# Patient Record
Sex: FEMALE | Race: BLACK OR AFRICAN AMERICAN | NOT HISPANIC OR LATINO | Employment: FULL TIME | URBAN - METROPOLITAN AREA
[De-identification: names, ages, dates, MRNs, and addresses within clinical notes are randomized per-mention and may not be internally consistent; named-entity substitution may affect disease eponyms.]

---

## 2022-06-15 LAB
EXTERNAL ABO GROUPING: NORMAL
EXTERNAL ANTIBODY SCREEN: NORMAL
EXTERNAL HEPATITIS B SURFACE ANTIGEN: NORMAL
EXTERNAL HIV-1/2 AB-AG: NORMAL
EXTERNAL RH FACTOR: POSITIVE
EXTERNAL RUBELLA IGG QUANTITATION: 5.66
EXTERNAL SYPHILIS RPR SCREEN: NORMAL

## 2022-11-09 LAB
GLUCOSE 1H P GLC SERPL-MCNC: 132 MG/DL
GLUCOSE 2H P 75 G GLC PO SERPL-MCNC: 70 MG/DL
GLUCOSE 3H P 100 G GLC PO SERPL-MCNC: 50 MG/DL
GLUCOSE P FAST SERPL-MCNC: 67 MG/DL

## 2022-12-01 ENCOUNTER — HOSPITAL ENCOUNTER (OUTPATIENT)
Facility: HOSPITAL | Age: 37
Discharge: HOME | End: 2022-12-01
Attending: OBSTETRICS & GYNECOLOGY | Admitting: OBSTETRICS & GYNECOLOGY

## 2022-12-01 VITALS
HEIGHT: 67 IN | DIASTOLIC BLOOD PRESSURE: 73 MMHG | RESPIRATION RATE: 18 BRPM | HEART RATE: 77 BPM | BODY MASS INDEX: 37.51 KG/M2 | SYSTOLIC BLOOD PRESSURE: 113 MMHG | WEIGHT: 239 LBS | OXYGEN SATURATION: 98 % | TEMPERATURE: 97.6 F

## 2022-12-01 PROBLEM — Z3A.34 34 WEEKS GESTATION OF PREGNANCY: Status: ACTIVE | Noted: 2022-12-01

## 2022-12-01 RX ORDER — ASPIRIN 81 MG/1
81 TABLET, CHEWABLE ORAL DAILY
COMMUNITY

## 2022-12-01 RX ORDER — FERROUS SULFATE 325(65) MG
325 TABLET ORAL
COMMUNITY

## 2022-12-01 NOTE — PROGRESS NOTES
L&D Triage Note - OB/GYN  Corrine Stevens 30 y o  female MRN: [id number]  Unit/Bed#: LD TRIAGE 2 Encounter: [id number]      ASSESSMENT:    Corrine Stevens is a 30 y o  [id number] at 34w5d presenting to triage this afternoon for leakage of fluid    PLAN:    1) rule out rupture of membranes  - Denies abdominal pain at this time  Reports intermittent and inconsistent contractions  - Speculum examination: cervical os appears visually closed, no vaginal bleeding or pooling noted, small amount of white vaginal discharge noted  - Nitrazine negative, Slides negative for ferning, clue cells, hyphae, trichomonads on microscopy  - IQRA 12 35 cm  - SVE 0 5/50/-3, posterior  - Vital signs stable in triage this afternoon    Discharge Instructions:   Continue routine prenatal care  Discharge from [facility] triage with  labor precautions    - Reviewed rupture of membranes, false vs true labor, decreased fetal movement, and vaginal bleeding   - Pt to call provider with any concerns and follow up at her next scheduled prenatal appointment with Dr Mark Heard next week  - Case discussed with Dr Waylon Gottlieb, PGY-4  Dr Gal Bañuelos was available for discussion of pt care  SUBJECTIVE:    Corrine Stevens 30 y o  [id number] at 34w5d with an Estimated Date of Delivery: 23     Ramon Ceballos states that she woke up at 0250 hrs this morning in a puddle of clear odorless fluid  She reports intermittent and inconsistent contractions  She reports that she has not leaked significant fluid since that time  She denies any vaginal bleeding  She reports good fetal movement  Reports intermittent headache that resolves without intervention  She denies fever, chills, chest pain, shortness of breath, RUQ pain, or any other complaints at this time  She reports that she receives her care in [place] and recently moved to the area  She states that she still follows with Dr Mark Heard in [place] and has appt with him next week  All hx per pt   Record release signed in the event that she require    Her current obstetrical history is significant for elevated 1 hr GTT, but normal 3 hr GTT  Anemia in pregnancy (per pt labs on her phone - most recent 10/2022 Hgb 9 8, Plt 265,000)  She report normotensive pregnancy  Her past obstetrical history is significant for Hx Term  at 38 weeks ()  Pt reports that she was admitted to ICU for preeclampsia with severe features that she developed on PPD#3 for severe blood pressures       Reported Surgical hx:   - Cholecystectomy  - Endometrioma removal during prior pregnancy    Medications:    - Prenatal Vitamin daily  - Tylenol PRN  - 81 mg ASA daily  - Metamucil daily    Allergies:   Ceftriaxone    OBJECTIVE:    Vitals:    22 1325   BP: 113/73   Pulse: 77   Resp:    Temp:    SpO2:      ROS:  Constitutional: Negative  Respiratory: Negative  Cardiovascular: Negative    Gastrointestinal: Negative    General Physical Exam:  General: in no apparent distress, in no respiratory distress and acyanotic, afebrile and normal vitals  Cardiovascular: Cor RRR  Lungs: non-labored breathing  Abdomen: abdomen is soft without significant tenderness, masses, organomegaly or guarding  Lower extremeties: nontender    Cervical Exam  Speculum: Speculum examination: cervical os appears visually closed, no vaginal bleeding or pooling noted, small amount of white vaginal discharge noted  SVE: 0 5 / 50% / -3    Fetal monitoring:  FHT:  120 bpm/ Moderate 6 - 25 bpm / 15 x 15 accelerations present, no decelerations  [place]: contractions intermittent and inconsistent     KOH/WTMT:     Infection:   - no clue cells    - no hyphae   - no trichomonads present    Membrane status   - no ferning   - negative Nitrazine   - no pooling     Imaging:      Abd  US   IQRA      - Q1 3 95 cm     - Q2 4 2 cm     - Q3 1 93 cm     - Q4 2 27 cm     - Total: 12 35 cm   Placenta: Anterior   Presentation: [address], DO,  OBGYN PGY-1  2022 2:11 PM

## 2022-12-01 NOTE — PROCEDURES
Gigi Lin, a [id number] at 34w5d with an NOEL of 1/7/2023, by Last Menstrual Period, was seen at [address] for the following procedure(s): $Procedure Type: IQRA]         4 Quadrant IQRA  IQRA Q1 (cm): 4 cm  IQRA Q2 (cm): 4 2 cm  IQRA Q3 (cm): 1 9 cm  IQRA Q4 (cm): 2 3 cm  IQRA TOTAL (cm): 12 4 cm                       Roosevelt Hayes DO  OB/GYN PGY-1

## 2022-12-06 LAB — EXTERNAL GROUP B STREP ANTIGEN: NEGATIVE

## 2022-12-25 ENCOUNTER — ANESTHESIA EVENT (INPATIENT)
Dept: ANESTHESIOLOGY | Facility: HOSPITAL | Age: 37
End: 2022-12-25

## 2022-12-25 ENCOUNTER — ANESTHESIA (INPATIENT)
Dept: ANESTHESIOLOGY | Facility: HOSPITAL | Age: 37
End: 2022-12-25

## 2022-12-25 ENCOUNTER — HOSPITAL ENCOUNTER (INPATIENT)
Facility: HOSPITAL | Age: 37
LOS: 2 days | Discharge: HOME | End: 2022-12-27
Attending: OBSTETRICS & GYNECOLOGY | Admitting: OBSTETRICS & GYNECOLOGY

## 2022-12-25 DIAGNOSIS — R82.5 POSITIVE URINE DRUG SCREEN: ICD-10-CM

## 2022-12-25 DIAGNOSIS — Z3A.38 38 WEEKS GESTATION OF PREGNANCY: ICD-10-CM

## 2022-12-25 PROBLEM — O09.299 HX OF PREECLAMPSIA, PRIOR PREGNANCY, CURRENTLY PREGNANT: Status: ACTIVE | Noted: 2022-12-25

## 2022-12-25 LAB
ABO GROUP BLD: NORMAL
ABO GROUP BLD: NORMAL
ALBUMIN SERPL BCP-MCNC: 3.3 G/DL (ref 3.5–5)
ALP SERPL-CCNC: 71 U/L (ref 34–104)
ALT SERPL W P-5'-P-CCNC: 24 U/L (ref 7–52)
AMPHETAMINES SERPL QL SCN: NEGATIVE
ANION GAP SERPL CALCULATED.3IONS-SCNC: 8 MMOL/L (ref 4–13)
AST SERPL W P-5'-P-CCNC: 26 U/L (ref 13–39)
BARBITURATES UR QL: NEGATIVE
BASE EXCESS BLDCOA CALC-SCNC: -3.3 MMOL/L (ref 3–11)
BASE EXCESS BLDCOV CALC-SCNC: -4.3 MMOL/L (ref 1–9)
BENZODIAZ UR QL: NEGATIVE
BILIRUB SERPL-MCNC: 0.49 MG/DL (ref 0.2–1)
BLD GP AB SCN SERPL QL: NEGATIVE
BUN SERPL-MCNC: 11 MG/DL (ref 5–25)
CALCIUM ALBUM COR SERPL-MCNC: 8.9 MG/DL (ref 8.3–10.1)
CALCIUM SERPL-MCNC: 8.3 MG/DL (ref 8.4–10.2)
CHLORIDE SERPL-SCNC: 107 MMOL/L (ref 96–108)
CO2 SERPL-SCNC: 20 MMOL/L (ref 21–32)
COCAINE UR QL: NEGATIVE
CREAT SERPL-MCNC: 0.67 MG/DL (ref 0.6–1.3)
CREAT UR-MCNC: 177 MG/DL
ERYTHROCYTE [DISTWIDTH] IN BLOOD BY AUTOMATED COUNT: 14.3 % (ref 11.6–15.1)
GFR SERPL CREATININE-BSD FRML MDRD: 114 ML/MIN/1.73SQ M
GLUCOSE SERPL-MCNC: 72 MG/DL (ref 65–140)
HCO3 BLDCOA-SCNC: 22.1 MMOL/L (ref 17.3–27.3)
HCO3 BLDCOV-SCNC: 19.8 MMOL/L (ref 12.2–28.6)
HCT VFR BLD AUTO: 32.3 % (ref 34.8–46.1)
HGB BLD-MCNC: 10.6 G/DL (ref 11.5–15.4)
MCH RBC QN AUTO: 27.2 PG (ref 26.8–34.3)
MCHC RBC AUTO-ENTMCNC: 32.8 G/DL (ref 31.4–37.4)
MCV RBC AUTO: 83 FL (ref 82–98)
METHADONE UR QL: NEGATIVE
O2 CT VFR BLDCOA CALC: 12.6 ML/DL
OPIATES UR QL SCN: POSITIVE
OXYCODONE+OXYMORPHONE UR QL SCN: NEGATIVE
OXYHGB MFR BLDCOA: 65.9 %
OXYHGB MFR BLDCOV: 75.2 %
PCO2 BLDCOA: 41.1 MM[HG] (ref 30–60)
PCO2 BLDCOV: 33.5 MM HG (ref 27–43)
PCP UR QL: NEGATIVE
PH BLDCOA: 7.35 [PH] (ref 7.23–7.43)
PH BLDCOV: 7.39 [PH] (ref 7.19–7.49)
PLATELET # BLD AUTO: 279 THOUSANDS/UL (ref 149–390)
PMV BLD AUTO: 10.1 FL (ref 8.9–12.7)
PO2 BLDCOA: 27.2 MM HG (ref 5–25)
PO2 BLDCOV: 32.2 MM HG (ref 15–45)
POTASSIUM SERPL-SCNC: 3.7 MMOL/L (ref 3.5–5.3)
PROT SERPL-MCNC: 6.5 G/DL (ref 6.4–8.4)
PROT UR-MCNC: 29 MG/DL
PROT/CREAT UR: 0.16 MG/G{CREAT} (ref 0–0.1)
RBC # BLD AUTO: 3.89 MILLION/UL (ref 3.81–5.12)
RH BLD: POSITIVE
RH BLD: POSITIVE
SAO2 % BLDCOV: 14.1 ML/DL
SODIUM SERPL-SCNC: 135 MMOL/L (ref 135–147)
SPECIMEN EXPIRATION DATE: NORMAL
THC UR QL: NEGATIVE
WBC # BLD AUTO: 9.84 THOUSAND/UL (ref 4.31–10.16)

## 2022-12-25 PROCEDURE — 3E033VJ INTRODUCTION OF OTHER HORMONE INTO PERIPHERAL VEIN, PERCUTANEOUS APPROACH: ICD-10-PCS | Performed by: OBSTETRICS & GYNECOLOGY

## 2022-12-25 PROCEDURE — 10H07YZ INSERTION OF OTHER DEVICE INTO PRODUCTS OF CONCEPTION, VIA NATURAL OR ARTIFICIAL OPENING: ICD-10-PCS | Performed by: OBSTETRICS & GYNECOLOGY

## 2022-12-25 PROCEDURE — 4A1HXCZ MONITORING OF PRODUCTS OF CONCEPTION, CARDIAC RATE, EXTERNAL APPROACH: ICD-10-PCS | Performed by: OBSTETRICS & GYNECOLOGY

## 2022-12-25 RX ORDER — ONDANSETRON 2 MG/ML
4 INJECTION INTRAMUSCULAR; INTRAVENOUS EVERY 6 HOURS PRN
Status: DISCONTINUED | OUTPATIENT
Start: 2022-12-25 | End: 2022-12-25

## 2022-12-25 RX ORDER — DIAPER,BRIEF,INFANT-TODD,DISP
1 EACH MISCELLANEOUS DAILY PRN
Status: DISCONTINUED | OUTPATIENT
Start: 2022-12-25 | End: 2022-12-27 | Stop reason: HOSPADM

## 2022-12-25 RX ORDER — OXYTOCIN/RINGER'S LACTATE 30/500 ML
1-30 PLASTIC BAG, INJECTION (ML) INTRAVENOUS
Status: DISCONTINUED | OUTPATIENT
Start: 2022-12-25 | End: 2022-12-25

## 2022-12-25 RX ORDER — IBUPROFEN 600 MG/1
600 TABLET ORAL EVERY 6 HOURS
Status: DISCONTINUED | OUTPATIENT
Start: 2022-12-25 | End: 2022-12-25

## 2022-12-25 RX ORDER — ROPIVACAINE HYDROCHLORIDE 2 MG/ML
INJECTION, SOLUTION EPIDURAL; INFILTRATION; PERINEURAL CONTINUOUS PRN
Status: DISCONTINUED | OUTPATIENT
Start: 2022-12-25 | End: 2022-12-26 | Stop reason: HOSPADM

## 2022-12-25 RX ORDER — BUPIVACAINE HYDROCHLORIDE 2.5 MG/ML
30 INJECTION, SOLUTION EPIDURAL; INFILTRATION; INTRACAUDAL ONCE AS NEEDED
Status: DISCONTINUED | OUTPATIENT
Start: 2022-12-25 | End: 2022-12-25

## 2022-12-25 RX ORDER — OXYTOCIN/RINGER'S LACTATE 30/500 ML
250 PLASTIC BAG, INJECTION (ML) INTRAVENOUS ONCE
Status: DISCONTINUED | OUTPATIENT
Start: 2022-12-25 | End: 2022-12-27 | Stop reason: HOSPADM

## 2022-12-25 RX ORDER — SODIUM CHLORIDE, SODIUM LACTATE, POTASSIUM CHLORIDE, CALCIUM CHLORIDE 600; 310; 30; 20 MG/100ML; MG/100ML; MG/100ML; MG/100ML
125 INJECTION, SOLUTION INTRAVENOUS CONTINUOUS
Status: DISCONTINUED | OUTPATIENT
Start: 2022-12-25 | End: 2022-12-25

## 2022-12-25 RX ORDER — DOCUSATE SODIUM 100 MG/1
100 CAPSULE, LIQUID FILLED ORAL 2 TIMES DAILY
Status: DISCONTINUED | OUTPATIENT
Start: 2022-12-25 | End: 2022-12-27 | Stop reason: HOSPADM

## 2022-12-25 RX ORDER — ACETAMINOPHEN 325 MG/1
975 TABLET ORAL EVERY 8 HOURS PRN
Status: DISCONTINUED | OUTPATIENT
Start: 2022-12-25 | End: 2022-12-25

## 2022-12-25 RX ORDER — CALCIUM CARBONATE 200(500)MG
1000 TABLET,CHEWABLE ORAL DAILY PRN
Status: DISCONTINUED | OUTPATIENT
Start: 2022-12-25 | End: 2022-12-27 | Stop reason: HOSPADM

## 2022-12-25 RX ORDER — CALCIUM CARBONATE 200(500)MG
500 TABLET,CHEWABLE ORAL DAILY PRN
Status: DISCONTINUED | OUTPATIENT
Start: 2022-12-25 | End: 2022-12-25

## 2022-12-25 RX ORDER — ONDANSETRON 2 MG/ML
4 INJECTION INTRAMUSCULAR; INTRAVENOUS EVERY 8 HOURS PRN
Status: DISCONTINUED | OUTPATIENT
Start: 2022-12-25 | End: 2022-12-27 | Stop reason: HOSPADM

## 2022-12-25 RX ORDER — LIDOCAINE HYDROCHLORIDE AND EPINEPHRINE 15; 5 MG/ML; UG/ML
INJECTION, SOLUTION EPIDURAL AS NEEDED
Status: DISCONTINUED | OUTPATIENT
Start: 2022-12-25 | End: 2022-12-26 | Stop reason: HOSPADM

## 2022-12-25 RX ORDER — BUTORPHANOL TARTRATE 1 MG/ML
1 INJECTION, SOLUTION INTRAMUSCULAR; INTRAVENOUS ONCE
Status: COMPLETED | OUTPATIENT
Start: 2022-12-25 | End: 2022-12-25

## 2022-12-25 RX ORDER — ACETAMINOPHEN 325 MG/1
650 TABLET ORAL EVERY 4 HOURS PRN
Status: DISCONTINUED | OUTPATIENT
Start: 2022-12-25 | End: 2022-12-27 | Stop reason: HOSPADM

## 2022-12-25 RX ADMIN — ROPIVACAINE HYDROCHLORIDE 10 ML/HR: 2 INJECTION, SOLUTION EPIDURAL; INFILTRATION at 11:04

## 2022-12-25 RX ADMIN — SODIUM CHLORIDE, POTASSIUM CHLORIDE, SODIUM LACTATE AND CALCIUM CHLORIDE 125 ML/HR: 600; 310; 30; 20 INJECTION, SOLUTION INTRAVENOUS at 13:55

## 2022-12-25 RX ADMIN — LIDOCAINE HYDROCHLORIDE AND EPINEPHRINE 3 ML: 15; 5 INJECTION, SOLUTION EPIDURAL at 11:01

## 2022-12-25 RX ADMIN — ROPIVACAINE HYDROCHLORIDE: 2 INJECTION, SOLUTION EPIDURAL; INFILTRATION at 11:14

## 2022-12-25 RX ADMIN — Medication 1 APPLICATION: at 19:14

## 2022-12-25 RX ADMIN — Medication 2 MILLI-UNITS/MIN: at 12:13

## 2022-12-25 RX ADMIN — SODIUM CHLORIDE, POTASSIUM CHLORIDE, SODIUM LACTATE AND CALCIUM CHLORIDE 125 ML/HR: 600; 310; 30; 20 INJECTION, SOLUTION INTRAVENOUS at 11:20

## 2022-12-25 RX ADMIN — LIDOCAINE HYDROCHLORIDE AND EPINEPHRINE 2 ML: 15; 5 INJECTION, SOLUTION EPIDURAL at 11:04

## 2022-12-25 RX ADMIN — IBUPROFEN 600 MG: 600 TABLET, FILM COATED ORAL at 17:26

## 2022-12-25 RX ADMIN — HYDROCORTISONE 1 APPLICATION: 1 CREAM TOPICAL at 19:14

## 2022-12-25 RX ADMIN — ACETAMINOPHEN 650 MG: 325 TABLET, FILM COATED ORAL at 22:37

## 2022-12-25 RX ADMIN — BUTORPHANOL TARTRATE 1 MG: 1 INJECTION, SOLUTION INTRAMUSCULAR; INTRAVENOUS at 09:22

## 2022-12-25 RX ADMIN — SODIUM CHLORIDE, POTASSIUM CHLORIDE, SODIUM LACTATE AND CALCIUM CHLORIDE 125 ML/HR: 600; 310; 30; 20 INJECTION, SOLUTION INTRAVENOUS at 08:15

## 2022-12-25 RX ADMIN — WITCH HAZEL 1 PAD: 500 SOLUTION RECTAL; TOPICAL at 19:14

## 2022-12-25 RX ADMIN — SODIUM CHLORIDE, POTASSIUM CHLORIDE, SODIUM LACTATE AND CALCIUM CHLORIDE 125 ML/HR: 600; 310; 30; 20 INJECTION, SOLUTION INTRAVENOUS at 09:19

## 2022-12-25 NOTE — PROGRESS NOTES
Second UDS for patient using initial urine sample again positive for opioids after being run on a 2nd machine in the lab  Patient has been considering what might cause a false positive UDS, and she disclosed that she eats 1 poppy seed bagel every single day  Upon literature review, 1 poppy seed bagel can contain enough poppy seeds to cause a false positive for opioids on a UDS  Though this cannot be definitively said to be a false positive, our clinical suspicion that this is a false positive result remains high  Discussed with patient that she will still be evaluated by case management postpartum but that this is a precaution, and she expressed understanding and is agreeable  Dr Alex Laboy aware      Jj Núñez MD  OBGYN Resident  12/25/22  2:56 PM

## 2022-12-25 NOTE — H&P
H & P- Obstetrics   Dimple Mar 30 y o  female MRN: [id number]  Unit/Bed#: LD TRIAGE 2- Encounter: [id number]    Assessment: 30 y o  [id number] at 38w1d admitted for PROM vs  SROM in labor  SVE: 360/-3  FHT: category 1  Clinical EFW: 7 5 lb by Leopold's maneuver  Cephalic confirmed by US  GBS status: negative  Postpartum contraception plan: undecided    Plan:   * 38 weeks gestation of pregnancy  Assessment & Plan  PROM vs  SROM in labor at 4 am for clear fluid  Contractions every 5 min  Prior  at term  Receives care in [place]  Prenatal labs reviewed on patient's EMR on her phone  · Admit for expectant management with plan for augmentation as needed pending cervical change  · Continuous fetal monitoring  · CLD  · Routine admission labs (CBC, RPR, T&S)  · Analgesia at maternal request    Hx of preeclampsia, prior pregnancy, currently pregnant  Assessment & Plan  History of postpartum preeclampsia with severe features in 1st pregnancy  Per patient, normotensive this pregnancy  Asymptomatic  · CBC, CMP, and P:C on admission  · Routine BP monitoring    Discussed case and plan w/ Dr Charley Snellen    Chief Complaint: leaking fluid and contractions    HPI:  Sandra Johnson is a 30 y o  [id number] with an NOEL of 2023, by Last Menstrual Period at 38w1d who is being admitted for PROM vs  SROM in labor  She complains of uterine contractions, occurring every 5 minutes, has large amounts of fluid leaking clear starting at 4 am, and reports no VB  She states she has felt good FM  Patient Active Problem List   Diagnosis   • 34 weeks gestation of pregnancy       Baby complications/comments: none    Review of Systems   Constitutional: Negative for chills and fever  Eyes: Negative for visual disturbance  Respiratory: Negative for cough and shortness of breath  Cardiovascular: Negative for chest pain and leg swelling  Gastrointestinal: Negative for abdominal pain, diarrhea, nausea and vomiting     Genitourinary: Negative for dysuria, frequency, hematuria, pelvic pain, urgency, vaginal bleeding and vaginal discharge  Neurological: Negative for dizziness, light-headedness and headaches  OB Hx:  OB History    Para Term  AB Living   4 1 1   2 1   SAB IAB Ectopic Multiple Live Births           1      # Outcome Date GA Lbr Oracio/2nd Weight Sex Delivery Anes PTL Lv   4 Current            3 AB 21           2 Term 14    F Vag-Spont EPI N ROGERS      Complications: Pre-eclampsia   1 AB 2004     TAB          Past Medical Hx:  No past medical history on file  Past Surgical hx:  No past surgical history on file  Social Hx:  Alcohol use: no  Tobacco use: no  Other substance use: no  Other: N/A    Allergies   Allergen Reactions   • Ceftriaxone Anaphylaxis       Medications Prior to Admission   Medication   • aspirin 81 mg chewable tablet   • ferrous sulfate 325 (65 Fe) mg tablet   • Prenatal MV-Min-Fe Fum-FA-DHA (PRENATAL 1 PO)   • psyllium (METAMUCIL) 58 6 % powder       Objective:  Temp:  [97 7 °F (36 5 °C)] 97 7 °F (36 5 °C)  HR:  [90] 90  Resp:  [18] 18  BP: (113)/(58) 113/58  Body mass index is 37 75 kg/m²  Physical Exam:  Physical Exam  Constitutional:       General: She is not in acute distress  Appearance: Normal appearance  She is not ill-appearing  HENT:      Mouth/Throat:      Mouth: Mucous membranes are moist    Eyes:      Extraocular Movements: Extraocular movements intact  Cardiovascular:      Rate and Rhythm: Normal rate and regular rhythm  Heart sounds: Normal heart sounds  Pulmonary:      Effort: Pulmonary effort is normal       Breath sounds: Normal breath sounds  Abdominal:      General: There is no distension  Palpations: Abdomen is soft  Tenderness: There is no abdominal tenderness  There is no guarding  Musculoskeletal:         General: No swelling  Right lower leg: No edema  Left lower leg: No edema     Neurological:      General: No focal deficit present  Mental Status: She is alert  Skin:     General: Skin is warm and dry  Coloration: Skin is not jaundiced or pale  Psychiatric:         Mood and Affect: Mood normal          Behavior: Behavior normal          Thought Content:  Thought content normal          Judgment: Judgment normal             FHT:  Baseline: 125 bpm  Variability: moderate  Accelerations: present  Decelerations: absent  Category 1    TOCO:  Contractions every 4-6 min    No results found for: WBC, HGB, HCT, PLT  No results found for: NA, K, CL, CO2, BUN, CREATININE, GLUCOSE, AST, ALT  Prenatal Labs: Reviewed      Blood type: O positive  Antibody: negative  GBS: negative  HIV: negative  Rubella: Immune  Varicella: Immune  VDRL/RPR: Non reactive  HBsAg: Negative  Chlamydia: Negative  Gonorrhea: Negative  Diabetes 1 hour screen: 157  3 hour glucose: 67, 132, 70, 50  Platelets: 939  Hgb: 9 8  Normal hemoglobin fractionation cascade  >2 Midnights  INPATIENT     Signature/Title: Aleida Rutherford MD  Date: 12/25/2022  Time: 7:42 AM

## 2022-12-25 NOTE — PLAN OF CARE
Problem: PAIN - ADULT  Goal: Verbalizes/displays adequate comfort level or baseline comfort level  Description: Interventions:  - Encourage patient to monitor pain and request assistance  - Assess pain using appropriate pain scale  - Administer analgesics based on type and severity of pain and evaluate response  - Implement non-pharmacological measures as appropriate and evaluate response  - Consider cultural and social influences on pain and pain management  - Notify physician/advanced practitioner if interventions unsuccessful or patient reports new pain  Outcome: Progressing     Problem: INFECTION - ADULT  Goal: Absence or prevention of progression during hospitalization  Description: INTERVENTIONS:  - Assess and monitor for signs and symptoms of infection  - Monitor lab/diagnostic results  - Monitor all insertion sites, i e  indwelling lines, tubes, and drains  - Monitor endotracheal if appropriate and nasal secretions for changes in amount and color  - [place] appropriate cooling/warming therapies per order  - Administer medications as ordered  - Instruct and encourage patient and family to use good hand hygiene technique  - Identify and instruct in appropriate isolation precautions for identified infection/condition  Outcome: Progressing  Goal: Absence of fever/infection during neutropenic period  Description: INTERVENTIONS:  - Monitor WBC    Outcome: Progressing     Problem: SAFETY ADULT  Goal: Patient will remain free of falls  Description: INTERVENTIONS:  - Educate patient/family on patient safety including physical limitations  - Instruct patient to call for assistance with activity   - Keep Call bell within reach  - Keep bed low and locked with side rails adjusted as appropriate  - Keep care items and personal belongings within reach  - Initiate and maintain comfort rounds  - Offer Toileting in advance of need    Outcome: Progressing     Problem: DISCHARGE PLANNING  Goal: Discharge to home or other facility with appropriate resources  Description: INTERVENTIONS:  - Identify barriers to discharge w/patient and caregiver  - Arrange for needed discharge resources and transportation as appropriate  - Identify discharge learning needs (meds, wound care, etc )  - Refer to Case Management Department for coordinating discharge planning if the patient needs post-hospital services based on physician/advanced practitioner order or complex needs related to functional status, cognitive ability, or social support system  Outcome: Progressing     Problem: Knowledge Deficit  Goal: Patient/family/caregiver demonstrates understanding of disease process, treatment plan, medications, and discharge instructions  Description: Complete learning assessment and assess knowledge base  Interventions:  - Provide teaching at level of understanding  - Provide teaching via preferred learning methods  Outcome: Progressing  Goal: Verbalizes understanding of labor plan  Description: Assess patient/family/caregiver's baseline knowledge level and ability to understand information  Provide education via patient/family/caregiver's preferred learning method at appropriate level of understanding  1  Provide teaching at level of understanding  2  Provide teaching via preferred learning method(s)    Outcome: Progressing     Problem: BIRTH - VAGINAL/ SECTION  Goal: Fetal and maternal status remain reassuring during the birth process  Description: INTERVENTIONS:  - Monitor vital signs  - Monitor fetal heart rate  - Monitor uterine activity  - Monitor labor progression (vaginal delivery)  - DVT prophylaxis  - Antibiotic prophylaxis  Outcome: Progressing  Goal: Emotionally satisfying birthing experience for mother/fetus  Description: Interventions:  - Assess, plan, implement and evaluate the nursing care given to the patient in labor  - Advocate the philosophy that each childbirth experience is a unique experience and support the family's chosen level of involvement and control during the labor process   - Actively participate in both the patient's and family's teaching of the birth process  - Consider cultural, Restorationism and age-specific factors and plan care for the patient in labor  Outcome: Progressing     Problem: Labor & Delivery  Goal: Manages discomfort  Description: Assess and monitor for signs and symptoms of discomfort  Assess patient's pain level regularly and per hospital policy  Administer medications as ordered  Support use of nonpharmacological methods to help control pain such as distraction, imagery, relaxation, and application of heat and cold  Collaborate with interdisciplinary team and patient to determine appropriate pain management plan  1  Include patient in decisions related to comfort  2  Offer non-pharmacological pain management interventions  3  Report ineffective pain management to physician  Outcome: Progressing  Goal: Patient vital signs are stable  Description: 1  Assess vital signs - vaginal delivery    Outcome: Progressing

## 2022-12-25 NOTE — ANESTHESIA PROCEDURE NOTES
Epidural Block    Patient location during procedure: OB  Start time: 12/25/2022 10:58 AM  Reason for block: procedure for pain and at surgeon's request  Staffing  Performed: CRNA   Anesthesiologist: Radha Gallo DO  Resident/CRNA: Zulema Ferrari CRNA  Preanesthetic Checklist  Completed: patient identified, IV checked, site marked, risks and benefits discussed, surgical consent, monitors and equipment checked, pre-op evaluation and timeout performed  Epidural  Patient position: sitting  Prep: ChloraPrep  Patient monitoring: frequent blood pressure checks, continuous pulse ox and heart rate  Approach: midline  Location: lumbar  Injection technique: JABIER air  Needle  Needle type: Tuohy   Needle gauge: 18 G  Catheter type: side hole  Catheter size: 20 G  Catheter at skin depth: 14 cm  Catheter securement method: stabilization device  Test dose: negative  Assessment  Number of attempts: 1negative aspiration for CSF, negative aspiration for heme and no paresthesia on injection  patient tolerated the procedure well with no immediate complications

## 2022-12-25 NOTE — ANESTHESIA PREPROCEDURE EVALUATION
Procedure:  LABOR ANALGESIA    Relevant Problems   ANESTHESIA (within normal limits)      CARDIO   (-) HTN (hypertension)      GI/HEPATIC (within normal limits)      /RENAL (within normal limits)      GYN   (+) 38 weeks gestation of pregnancy      HEMATOLOGY (within normal limits)      MUSCULOSKELETAL (within normal limits)      NEURO/PSYCH   (+) Hx of preeclampsia, prior pregnancy, currently pregnant      PULMONARY   (-) Asthma        Physical Exam    Airway    Mallampati score: III  TM Distance: >3 FB  Neck ROM: full     Dental   No notable dental hx     Cardiovascular      Pulmonary      Other Findings        Anesthesia Plan  ASA Score- 2     Anesthesia Type- epidural with ASA Monitors  Additional Monitors:   Airway Plan:           Plan Factors-Exercise tolerance (METS): >4 METS  Chart reviewed  Existing labs reviewed  Patient summary reviewed  Patient is not a current smoker  Induction- intravenous  Postoperative Plan-     Informed Consent- Anesthetic plan and risks discussed with patient  I personally reviewed this patient with the CRNA  Discussed and agreed on the Anesthesia Plan with the CRNA  Arianna Malone

## 2022-12-25 NOTE — ASSESSMENT & PLAN NOTE
Meeting postpartum milestones  Pain well controlled  Voiding spontaneously  Appropriate bowel function  Tolerating regular diet  Lochia wnl  Breastfeeding  · Continue routine postpartum care

## 2022-12-25 NOTE — L&D DELIVERY NOTE
Vaginal Delivery Summary - OB/GYN   Abigail Rosas 30 y o  female MRN: [id number]  Unit/Bed#: -01 Encounter: [id number]      Pre-delivery Diagnosis:   1  Pregnancy at 38w1d  2  History of postpartum preeclampsia with severe features in prior pregnancy    Post-delivery Diagnosis: same, delivered    Procedure: Spontaneous Vaginal Delivery    Attending: Dr Amparo Gonzales    Assistant(s): Dr Amairani Duncan, Dr Chanelle Jackson    Anesthesia: Epidural    QBL: 53 mL    Complications: none apparent    Specimens:   1  Arterial and venous cord gases  2  Cord blood  3  Segment of umbilical cord  4  Placenta to storage     Findings:  1  Viable male on 2022 at 1527, with APGARS of 9 and 9 at 1 and 5 minutes respectively  2  Spontaneous delivery of intact placenta at 1533  3  Intact perineum with hemostatic perineal abrasion  4  Blood gases:  Recent Results (from the past 1 hour(s))   CORD, Blood gas, arterial    Collection Time: 22  3:32 PM   Result Value Ref Range    pH, Cord Art 7 348 7 230 - 7 430    pCO2, Cord Art 41 1 30 0 - 60 0    pO2, Cord Art 27 2 (H) 5 0 - 25 0 mm HG    HCO3, Cord Art 22 1 17 3 - 27 3 mmol/L    Base Exc, Cord Art -3 3 (L) 3 0 - 11 0 mmol/L    O2 Content, Cord Art 12 6 ml/dl    O2 Hgb, Arterial Cord 65 9 %   CORD, Blood gas, venous    Collection Time: 22  3:32 PM   Result Value Ref Range    pH, Cord Todd 7 389 7 190 - 7 490    pCO2, Cord Todd 33 5 27 0 - 43 0 mm HG    pO2, Cord Todd 32 2 15 0 - 45 0 mm HG    HCO3, Cord Todd 19 8 12 2 - 28 6 mmol/L    Base Exc, Cord Todd -4 3 (L) 1 0 - 9 0 mmol/L    O2 Cont, Cord Todd 14 1 mL/dL    O2 HGB,VENOUS CORD 75 2 %       Disposition:  Patient tolerated the procedure well and was recovering in labor and delivery room     Brief history and labor course:  Ms Abigail Rosas is a 30 y o   at 38w1d  She presented to labor and delivery for contractions and leaking fluid    Her pregnancy was complicated by history of postpartum preeclampsia with severe features in a prior pregnancy  Cervical exam on admission was 3/60/-2 and was unchanged after 2 hours, at which time she was diagnosed with prelabor rupture of membranes (PROM) and started on pitocin for induction  She received an epidural then developed intermittent variable decelerations that improved with maternal repositioning and IV fluids  Contractions were difficult to trace with toco, and an IUPC was placed to better trace contractions and to better titrate pitocin  She then progressed to full dilation and began to push  Description of procedure:  After pushing for 3 minutes, at 1527 patient delivered a viable male , wt pending, apgars of 9 (1 min) and 9 (5 min)  The fetal vertex delivered spontaneously in SUSSY position  There was no nuchal cord  The left anterior shoulder delivered atraumatically with maternal expulsive forces and the assistance of gentle downward traction  The right posterior shoulder delivered with maternal expulsive forces and the assistance of gentle upward traction  The remainder of the fetus delivered spontaneously  Upon delivery, the infant was placed on the mothers abdomen and the cord was clamped and cut after delayed cord clamping  The infant was noted to cry spontaneously and was moving all extremities appropriately  There was no evidence for injury  Awaiting nurse resuscitators evaluated the   Arterial and venous cord blood gases and cord blood was collected for analysis  These were promptly sent to the lab  In the immediate post-partum, 30 units of IV pitocin was administered, and the uterus was noted to contract down well with massage and pitocin  The placenta delivered spontaneously at 1533 and was noted to have an eccentrally inserted 3 vessel cord  The vagina, cervix, perineum, and rectum were inspected and there was noted to be a hemostatic perineal abrasion which did not require repair      The fundus was firm and at the level of the umbilicus  At the conclusion of the procedure, all needle, sponge, and instrument counts were noted to be correct  Patient tolerated the procedure well and was allowed to recover in labor and delivery room with family and  before being transferred to the post-partum floor  Dr Karol Viera was present and participated in all key portions of the case        Radha Johns MD  2022  3:45 PM

## 2022-12-25 NOTE — ANESTHESIA POSTPROCEDURE EVALUATION
Juma Bennett male 13  y.o. 2  m.o.  Chief Complaint   Patient presents with   • Well Child   • Cough        History was provided by the stepmother.    Immunization History   Administered Date(s) Administered   • DTaP 2008, 02/23/2009, 04/01/2009, 07/13/2010, 10/11/2012   • Flucelvax Quad Vial =>4yrs 11/26/2019   • Hepatitis A 10/08/2009, 07/13/2010   • Hepatitis B 2008, 2008, 04/01/2009   • HiB 2008, 02/23/2009, 04/01/2009, 07/13/2010   • Hpv9 11/26/2019   • IPV 2008, 02/23/2009, 07/13/2010, 10/11/2012   • MMR 10/08/2009, 10/11/2012   • Meningococcal Conjugate 11/26/2019, 11/27/2019   • Pneumococcal Conjugate 13-Valent (PCV13) 2008, 02/23/2009, 04/01/2009, 07/13/2010   • Rotavirus Monovalent 2008, 04/01/2009   • Tdap 11/26/2019   • Varicella 10/08/2009, 10/11/2012       The following portions of the patient's history were reviewed and updated as appropriate: allergies, current medications, past family history, past medical history, past social history, past surgical history and problem list.    Current Issues:  Current concerns include cold symptoms.    Review of Nutrition:  Current diet: regular  Balanced diet? yes  Exercise: regular  Screen Time: limited  Dentist: regular    Social Screening:  Discipline concerns? no  Concerns regarding behavior with peers? no  School performance: doing well; no concerns  Grade: 5th  Secondhand smoke exposure? yes - everyone    Helmet Use:  no  Seat Belt Us:  yes  Sunscreen Use:  no  Guns in home:  yes  Smoke Detectors:  no  CO Detectors: no  Hot Water Heater 120 degrees:  yes    Review of Systems   Constitutional: Negative for activity change, appetite change, chills, fatigue and fever.   HENT: Negative for congestion, ear discharge, ear pain, facial swelling, mouth sores, nosebleeds, postnasal drip, rhinorrhea, sinus pressure, sneezing, sore throat and trouble swallowing.    Eyes: Negative for discharge, redness and  Post-Op Assessment Note    CV Status:  Stable  Pain Score: 0    Pain management: adequate     Mental Status:  Alert and awake   Hydration Status:  Euvolemic   PONV Controlled:  Controlled   Airway Patency:  Patent      Post Op Vitals Reviewed: Yes      Staff: CRNA     Post-op block assessment: adhesive bandage applied, site cleaned, catheter intact and no complications      No notable events documented      /89 (12/25/22 1700)    Temp     Pulse     Resp      SpO2 itching.   Respiratory: Negative for apnea, cough, choking, shortness of breath, wheezing and stridor.    Cardiovascular: Negative for chest pain, palpitations and leg swelling.   Gastrointestinal: Negative for abdominal distention, abdominal pain, constipation, diarrhea, nausea and vomiting.   Endocrine: Negative.    Genitourinary: Negative.    Musculoskeletal: Negative.    Skin: Negative.  Negative for pallor and rash.   Allergic/Immunologic: Negative.    Neurological: Negative for dizziness, seizures, speech difficulty, weakness, light-headedness and headaches.   Hematological: Negative for adenopathy. Does not bruise/bleed easily.   Psychiatric/Behavioral: Negative for behavioral problems and sleep disturbance. The patient is not hyperactive.    All other systems reviewed and are negative.    Growth parameters are noted and are appropriate for age.     Vitals:    11/26/19 1106   BP: 110/70   Temp: (!) 96.5 °F (35.8 °C)       Physical Exam   Constitutional: He appears well-developed and well-nourished. He is active. No distress.   HENT:   Right Ear: Tympanic membrane normal.   Left Ear: Tympanic membrane normal.   Nose: Nose normal. No nasal discharge.   Mouth/Throat: Mucous membranes are moist. Dentition is normal. No dental caries. No tonsillar exudate. Oropharynx is clear. Pharynx is normal.   Eyes: Conjunctivae and EOM are normal. Pupils are equal, round, and reactive to light. Right eye exhibits no discharge. Left eye exhibits no discharge.   Neck: Normal range of motion. Neck supple. No neck rigidity.   Cardiovascular: Normal rate, regular rhythm, S1 normal and S2 normal. Pulses are strong and palpable.   No murmur heard.  Pulmonary/Chest: Effort normal and breath sounds normal. There is normal air entry. No stridor. No respiratory distress. Air movement is not decreased. He has no wheezes. He has no rhonchi. He has no rales. He exhibits no retraction.   Abdominal: Soft. Bowel sounds are normal. He exhibits  no distension and no mass. There is no hepatosplenomegaly. There is no tenderness. There is no rebound and no guarding. No hernia.   Musculoskeletal: Normal range of motion. He exhibits no edema, tenderness, deformity or signs of injury.   No scoliosis   Lymphadenopathy: No occipital adenopathy is present.     He has no cervical adenopathy.   Neurological: He is alert. He displays normal reflexes. No cranial nerve deficit. He exhibits normal muscle tone. Coordination normal.   Skin: Skin is warm and dry. No petechiae, no purpura and no rash noted. No cyanosis. No jaundice or pallor.   Nursing note and vitals reviewed.      Healthy 13 y.o.  well adolescent.        1. Anticipatory guidance discussed.  Gave handout on well-child issues at this age.    The patient was counseled regarding stranger safety, gun safety, seatbelt use, sunscreen use, and helmet use.  Discussed safe driving.    2. Vaccinations are updated    3. Development: appropriate for age        Orders Placed This Encounter   Procedures   • Tdap Vaccine Greater Than or Equal To 6yo IM   • HPV Vaccine (HPV9)   • Flucelvax Quad (Vial) =>4 yrs (0613-4136)   • Meningococcal Conjugate Vaccine 4-Valent IM       Return in about 1 year (around 11/26/2020) for well adolescent exam.

## 2022-12-25 NOTE — OB LABOR/OXYTOCIN SAFETY PROGRESS
Labor Progress Note - Vince Rossi 30 y o  female MRN: [id number]    Unit/Bed#: -01 Encounter: [id number]       Contraction Frequency (minutes): 4-6  Contraction Quality: Moderate  Tachysystole: No   Cervical Dilation: 3        Cervical Effacement: 60  Fetal Station: -2  Baseline Rate: 120 bpm  Fetal Heart Rate: 120 BPM  FHR Category: Category I               Vital Signs:   Vitals:    12/25/22 1023   BP: 96/51   Pulse:    Resp:    Temp:        Notes/comments:   SVE unchanged  FHT category 1 with contractions every 6-8 min though currently not tracing well on toco   Will start pitocin for induction (indication PROM)  Will get epidural now  Dr Rebeca Espitia aware          Branden Braga MD 12/25/2022 11:00 AM

## 2022-12-25 NOTE — OB LABOR/OXYTOCIN SAFETY PROGRESS
Labor Progress Note - Lidia Kingp 30 y o  female MRN: [id number]    Unit/Bed#: -01 Encounter: [id number]       Contraction Frequency (minutes): 3-4  Contraction Quality: Mild      Cervical Dilation: 3        Cervical Effacement: 60  Fetal Station: -2  Baseline Rate: 133 bpm  Fetal Heart Rate: 130 BPM  FHR Category: Category II               Vital Signs:   Vitals:    12/25/22 0913   BP: 106/58   Pulse: 67   Resp: 16   Temp: 97 9 °F (36 6 °C)       Notes/comments:    Odalis Mendiola was noted to have late deceleration after being positioned in labor room  She reports that this was the most painful contraction so far  She was turned to her right side with return to appropriate FHTs  She asked regarding management of decelerations which reviewed (positioning, O2, IUPC/amnio, SVEs, terb, C/S etc )  Will continue to monitor and reassess as indicated      Denzel Servin MD 12/25/2022 9:48 AM

## 2022-12-25 NOTE — DISCHARGE SUMMARY
Discharge Summary - OB/GYN  Shanice Mcfarland 30 y o  female MRN: [id number]  Unit/Bed#: -01 Encounter: [id number]    Admission Date: 2022     Discharge Date: 22    Admitting Attending: Kassandra Kaur MD    Delivering Attending: Dr Tolu Metz    Discharging Attending: Dr Paul Champagne    Principal Diagnosis: Pregnancy at 38w1d    Secondary Diagnosis:   1  History of postpartum preeclampsia with severe features in prior pregnancy  2  History of marijuana use  3  UDS positive for opioids with concern for false positive result  4  History of gastric sleeve    Procedures: spontaneous vaginal delivery    Anesthesia: epidural    Hospital course:  Ms Shanice Mcfarland is a 30 y o   at 38w1d  She presented to labor and delivery for contractions and leaking fluid  Her pregnancy was complicated by history of postpartum preeclampsia with severe features in a prior pregnancy  Cervical exam on admission was 3/60/-2 and was unchanged after 2 hours, at which time she was diagnosed with prelabor rupture of membranes (PROM) and started on pitocin for induction  She received an epidural then developed intermittent variable decelerations that improved with maternal repositioning and IV fluids  Contractions were difficult to trace with toco, and an IUPC was placed to better trace contractions and to better titrate pitocin  She then progressed to full dilation and began to push  She delivered a viable male  on 2022 at 26  Weight 5lbs 9 6oz via normal spontaneous vaginal delivery  She sustained a perineal abrasion which was hemostatic and did not require repair  Apgars were 9 (1 min) and 9 (5 min)   was transferred to  nursery  Patient tolerated the procedure well  Her post-delivery course was uncomplicated  Her postpartum pain was well controlled with oral analgesics  On day of discharge, she was ambulating and able to reasonably perform all ADLs   She was voiding and had appropriate bowel function  Pain was well controlled  She was discharged home on postpartum day #2 without complications  Patient was instructed to follow up with her OB as an outpatient and was given appropriate warnings to call provider if she develops signs of infection or uncontrolled pain  Complications: none apparent    Condition at discharge: good     Discharge instructions/Information to patient and family:   See after visit summary for information provided to patient and family  Provisions for Follow-Up Care:  See after visit summary for information related to follow-up care and any pertinent home health orders  Disposition: See After Visit Summary for discharge disposition information  Planned Readmission: No    Discharge medications and instructions:   Please see AVS for full list of medications upon discharge        Radha Johns MD  OBGYN Resident  12/27/22  6:52 AM

## 2022-12-25 NOTE — PLAN OF CARE
Problem: Knowledge Deficit  Goal: Patient/family/caregiver demonstrates understanding of disease process, treatment plan, medications, and discharge instructions  Description: Complete learning assessment and assess knowledge base  Interventions:  - Provide teaching at level of understanding  - Provide teaching via preferred learning methods  Outcome: Completed  Goal: Verbalizes understanding of labor plan  Description: Assess patient/family/caregiver's baseline knowledge level and ability to understand information  Provide education via patient/family/caregiver's preferred learning method at appropriate level of understanding  1  Provide teaching at level of understanding  2  Provide teaching via preferred learning method(s)  Outcome: Completed     Problem: BIRTH - VAGINAL/ SECTION  Goal: Fetal and maternal status remain reassuring during the birth process  Description: INTERVENTIONS:  - Monitor vital signs  - Monitor fetal heart rate  - Monitor uterine activity  - Monitor labor progression (vaginal delivery)  - DVT prophylaxis  - Antibiotic prophylaxis  Outcome: Completed  Goal: Emotionally satisfying birthing experience for mother/fetus  Description: Interventions:  - Assess, plan, implement and evaluate the nursing care given to the patient in labor  - Advocate the philosophy that each childbirth experience is a unique experience and support the family's chosen level of involvement and control during the labor process   - Actively participate in both the patient's and family's teaching of the birth process  - Consider cultural, Jew and age-specific factors and plan care for the patient in labor  Outcome: Completed     Problem: Labor & Delivery  Goal: Manages discomfort  Description: Assess and monitor for signs and symptoms of discomfort  Assess patient's pain level regularly and per hospital policy  Administer medications as ordered   Support use of nonpharmacological methods to help control pain such as distraction, imagery, relaxation, and application of heat and cold  Collaborate with interdisciplinary team and patient to determine appropriate pain management plan  1  Include patient in decisions related to comfort  2  Offer non-pharmacological pain management interventions  3  Report ineffective pain management to physician  Outcome: Completed  Goal: Patient vital signs are stable  Description: 1  Assess vital signs - vaginal delivery    Outcome: Completed

## 2022-12-25 NOTE — OB LABOR/OXYTOCIN SAFETY PROGRESS
Oxytocin Safety Progress Check Note - Rin Lin 30 y o  female MRN: [id number]    Unit/Bed#: -01 Encounter: [id number]    Dose (verena-units/min) Oxytocin: 8 verena-units/min  Contraction Frequency (minutes): 4-6  Contraction Quality: Moderate  Tachysystole: No   Cervical Dilation: 4        Cervical Effacement: 80  Fetal Station: -1  Baseline Rate: 110 bpm  Fetal Heart Rate: 110 BPM  FHR Category: Category I               Vital Signs:   Vitals:    12/25/22 1358   BP:    Pulse:    Resp:    Temp: (!) 97 4 °F (36 3 °C)   SpO2:        Notes/comments:   Patient resting comfortably  FHT category 1 with contractions every 4-6 min  Pit running at 8, continue titrating to contractions  SVE deferred  Will recheck in 2 hours or sooner if clinically indicated  UDS noted to be positive for opioids  The urine sample was drawn prior to administration of opioids this admission  I discussed this result with the patient, and she is very forthcoming in disclosing a history of marijuana use in the past 2 years (prior to this pregnancy) but declines opioid use despite positive result  I called the lab and asked if they have any ideas as to what could have caused a false positive result and they do not have any suggestions  They offered to re-run the same urine sample on a different machine and will update us with the results  Discussed with Dr Cecelia Reveal Jackson Goodell, MD 12/25/2022 2:19 PM

## 2022-12-25 NOTE — OB LABOR/OXYTOCIN SAFETY PROGRESS
Oxytocin Safety Progress Check Note - Emil Rivera 30 y o  female MRN: [id number]    Unit/Bed#: -01 Encounter: [id number]    Dose (verena-units/min) Oxytocin: 2 verena-units/min  Contraction Frequency (minutes): 4-6  Contraction Quality: Moderate  Tachysystole: No   Cervical Dilation: 4        Cervical Effacement: 80  Fetal Station: -1  Baseline Rate: 115 bpm  Fetal Heart Rate: 115 BPM  FHR Category: Category II               Vital Signs:   Vitals:    12/25/22 1215   BP: 108/52   Pulse: 73   Resp:    Temp:    SpO2: 94%       Notes/comments:   Intermittent variable decelerations improving with maternal repositioning and IVF, category 2 FHT  Moderate variability with acceleration with fetal scalp stimulation  SVE now 4/80/-1  Patient feeling contractions every 4-6 min prior to epidural, though now comfortable and contractions not tracing well on toco   IUPC placed to better monitor contractions and titrate pitocin  Pit running at 2, continue titrating to contractions  Dr Laurie Sewell aware          Floyd Amos MD 12/25/2022 12:25 PM

## 2022-12-26 LAB — RPR SER QL: NORMAL

## 2022-12-26 RX ORDER — PURIFIED WATER 986 MG/ML
SOLUTION OPHTHALMIC 3 TIMES DAILY PRN
Status: COMPLETED | OUTPATIENT
Start: 2022-12-26 | End: 2022-12-26

## 2022-12-26 RX ADMIN — PURIFIED WATER: 986 SOLUTION OPHTHALMIC at 04:25

## 2022-12-26 RX ADMIN — ACETAMINOPHEN 650 MG: 325 TABLET, FILM COATED ORAL at 10:10

## 2022-12-26 RX ADMIN — ACETAMINOPHEN 650 MG: 325 TABLET, FILM COATED ORAL at 14:14

## 2022-12-26 RX ADMIN — ACETAMINOPHEN 650 MG: 325 TABLET, FILM COATED ORAL at 04:25

## 2022-12-26 RX ADMIN — ACETAMINOPHEN 650 MG: 325 TABLET, FILM COATED ORAL at 17:40

## 2022-12-26 RX ADMIN — ACETAMINOPHEN 650 MG: 325 TABLET, FILM COATED ORAL at 21:54

## 2022-12-26 NOTE — PLAN OF CARE
Problem: PAIN - ADULT  Goal: Verbalizes/displays adequate comfort level or baseline comfort level  Description: Interventions:  - Encourage patient to monitor pain and request assistance  - Assess pain using appropriate pain scale  - Administer analgesics based on type and severity of pain and evaluate response  - Implement non-pharmacological measures as appropriate and evaluate response  - Consider cultural and social influences on pain and pain management  - Notify physician/advanced practitioner if interventions unsuccessful or patient reports new pain  Outcome: Progressing     Problem: INFECTION - ADULT  Goal: Absence or prevention of progression during hospitalization  Description: INTERVENTIONS:  - Assess and monitor for signs and symptoms of infection  - Monitor lab/diagnostic results  - Monitor all insertion sites, i e  indwelling lines, tubes, and drains  - Monitor endotracheal if appropriate and nasal secretions for changes in amount and color  - [place] appropriate cooling/warming therapies per order  - Administer medications as ordered  - Instruct and encourage patient and family to use good hand hygiene technique  - Identify and instruct in appropriate isolation precautions for identified infection/condition  Outcome: Progressing  Goal: Absence of fever/infection during neutropenic period  Description: INTERVENTIONS:  - Monitor WBC    Outcome: Progressing     Problem: SAFETY ADULT  Goal: Patient will remain free of falls  Description: INTERVENTIONS:  - Educate patient/family on patient safety including physical limitations  - Instruct patient to call for assistance with activity   - Keep Call bell within reach  - Keep bed low and locked with side rails adjusted as appropriate  - Keep care items and personal belongings within reach  - Initiate and maintain comfort rounds  - Offer Toileting in advance of need    Outcome: Progressing     Problem: DISCHARGE PLANNING  Goal: Discharge to home or other facility with appropriate resources  Description: INTERVENTIONS:  - Identify barriers to discharge w/patient and caregiver  - Arrange for needed discharge resources and transportation as appropriate  - Identify discharge learning needs (meds, wound care, etc )  - Refer to Case Management Department for coordinating discharge planning if the patient needs post-hospital services based on physician/advanced practitioner order or complex needs related to functional status, cognitive ability, or social support system  Outcome: Progressing     Problem: POSTPARTUM  Goal: Experiences normal postpartum course  Description: INTERVENTIONS:  - Monitor maternal vital signs  - Assess uterine involution and lochia  Outcome: Progressing  Goal: Appropriate maternal -  bonding  Description: INTERVENTIONS:  - Identify family support  - Assess for appropriate maternal/infant bonding   -Encourage maternal/infant bonding opportunities  - Referral to  or  as needed  Outcome: Progressing  Goal: Establishment of infant feeding pattern  Description: INTERVENTIONS:  - Assess breast/bottle feeding  - Refer to lactation as needed  Outcome: Progressing  Goal: Incision(s), wounds(s) or drain site(s) healing without S/S of infection  Description: INTERVENTIONS  - Assess and document dressing, incision, wound bed, drain sites and surrounding tissue  - Provide patient and family education  - Perform skin care/dressing changes every come: Progressing

## 2022-12-26 NOTE — CASE MANAGEMENT
Case Management Progress Note    Patient name Dc Tovar  Location /-60 MRN [id number]  : 1985 Date 2022       LOS (days): 1  Geometric Mean LOS (GMLOS) (days):   Days to GMLOS:        OBJECTIVE:        Current admission status: Inpatient  Preferred Pharmacy:   [facility] [address] [place] [address]  Phone: [phone] Fax: [phone]    Primary Care Provider: No primary care provider on file  Primary Insurance: [employer]  Secondary Insurance:     PROGRESS NOTE:    Consult: Hx drug/ETOH/MH    Per review of chart, MOB UDS results were negative for THC on admission  MOB had positive result for opioids  Infant UDS results negative  Cord blood sent  Per chart review: "UDS on admission positive for opioids   Patient reports history of marijuana use prior to this pregnancy but within the past 2 years but declines opioid use   She does eat 1 poppy seed bagel daily, and literature review does confirm this could be sufficient to cause a false positive   While we cannot definitively say this is a false positive, our clinical suspicion this is a false positive is high "    As no report of substance use or definitive finding that this is a false positive, no Childline report will be made at this time  No additional SW concerns noted for discharge

## 2022-12-26 NOTE — LACTATION NOTE
This note was copied from a baby's chart  Met with parents to discuss feeding plan  Mother is planning on exclusively breastfeeding her baby  Baby has been sleepy and was sleepy during the encounter and did latch  Mother was assisted with hand expression into a medication cup that was then placed into a syringe and baby was syringe fed 1/2 ml of colostrum  The Ready, Set, Baby Booklet was discussed  Discussed importance of skin to skin to help baby awaken for breastfeeding, to help with milk production as well as stabilize temperature, blood sugars, decrease pain, promote relaxation, and calm the baby as well as for bonding that father may do as well  Showed images of tummy size progression as milk production increases to meet the nutritional/growing needs of the baby  Discussed alternative feeding methods as a manner to provide baby with additional colostrum/breast milk if baby is sleepy and/or unable to breastfeed directly to help protect the milk supply and preserve latching abilities at the breast, which was completed during this encounter  Discussed “Second Night Syndrome” explaining how baby’s cluster feeds to meet growing needs  Growth spurts were explained and how cluster feeding helps boost milk supply  Explained feeding cues and fullness cues as well as importance of obtaining a deep latch for effective milk removal and proper positioning (tummy to tummy, at level, nose to nipple, bring chin to breast first and bringing baby to breast) with ear, shoulder, and hip alignment  Demonstrated on breast model how to hold, compress and perform hand expression  Addressed breast pump needs and mother discussed that she has an electric breast pump and a hands off portable breast pump for home use  Parents were made aware of how to communicate with lactation and encouraged to reach out for continued support, latch assessment and/or questions that arise

## 2022-12-26 NOTE — PROGRESS NOTES
Progress Note - OB/GYN  Parish Flores 30 y o  female MRN: [id number]  Unit/Bed#: -01 Encounter: [id number]    Assessment and Plan:  Parish Flores is a patient of: [address] [place] [address] by default  She is PPD# 1 s/p  spontaneous vaginal delivery  Recovering well and is stable  By issue:      *  (spontaneous vaginal delivery)  Assessment & Plan  Pain well controlled  Voiding spontaneously  Appropriate bowel function  Tolerating regular diet  Lochia wnl  Breastfeeding  · Continue routine postpartum care    Hx of preeclampsia, prior pregnancy, currently pregnant  Assessment & Plan  History of postpartum preeclampsia with severe features in 1st pregnancy  Per patient, normotensive this pregnancy  Asymptomatic  CBC, CMP, and P:C wnl on admission  Normotensive this admission  · Continue routine BP monitoring      Disposition    - Anticipate discharge home on PPD# 2      Subjective/Objective     Chief Complaint: Postpartum State     Subjective:    Parish Flores is PPD#1 s/p  spontaneous vaginal delivery  She has no current complaints  Pain is well controlled  Patient is currently voiding  She is ambulating  Patient is currently passing flatus and has had no bowel movement  She is tolerating PO, and denies nausea or vomitting  Patient denies fever, chills, chest pain, shortness of breath, or calf tenderness  Lochia is normal  She is  Breastfeeding  She is recovering well and is stable         Vitals:   /67 (BP Location: Left arm)   Pulse 64   Temp (!) 97 4 °F (36 3 °C) (Oral)   Resp 16   Ht 5' 7" (1 702 m)   Wt 109 kg (241 lb)   LMP 2022   SpO2 98%   Breastfeeding Yes   BMI 37 75 kg/m²       Intake/Output Summary (Last 24 hours) at 2022 3590  Last data filed at 2022 0230  Gross per 24 hour   Intake --   Output 1203 ml   Net -1203 ml       Invasive Devices     Peripheral Intravenous Line  Duration           Peripheral IV 22 Left;Dorsal (posterior) Forearm <1 day                Physical Exam:   GEN: Dc Tovar appears well, alert and oriented x 3, pleasant and cooperative   CARDIO: RRR, no murmurs or rubs  RESP:  CTAB, no wheezes or rales  ABDOMEN: soft, no tenderness, no distention, fundus @ U -1  EXTREMITIES: SCDs on, non tender, no erythema, b/l Silverio's sign negative      Labs:     Hemoglobin   Date Value Ref Range Status   12/25/2022 10 6 (L) 11 5 - 15 4 g/dL Final     WBC   Date Value Ref Range Status   12/25/2022 9 84 4 31 - 10 16 Thousand/uL Final     Platelets   Date Value Ref Range Status   12/25/2022 279 149 - 390 Thousands/uL Final     Creatinine   Date Value Ref Range Status   12/25/2022 0 67 0 60 - 1 30 mg/dL Final     Comment:     Standardized to IDMS reference method     AST   Date Value Ref Range Status   12/25/2022 26 13 - 39 U/L Final     Comment:     Specimen collection should occur prior to Sulfasalazine administration due to the potential for falsely depressed results  ALT   Date Value Ref Range Status   12/25/2022 24 7 - 52 U/L Final     Comment:     Specimen collection should occur prior to Sulfasalazine administration due to the potential for falsely depressed results             Philip Cook MD  12/26/2022  6:14 AM

## 2022-12-26 NOTE — PLAN OF CARE
Problem: PAIN - ADULT  Goal: Verbalizes/displays adequate comfort level or baseline comfort level  Description: Interventions:  - Encourage patient to monitor pain and request assistance  - Assess pain using appropriate pain scale  - Administer analgesics based on type and severity of pain and evaluate response  - Implement non-pharmacological measures as appropriate and evaluate response  - Consider cultural and social influences on pain and pain management  - Notify physician/advanced practitioner if interventions unsuccessful or patient reports new pain  Outcome: Progressing     Problem: INFECTION - ADULT  Goal: Absence or prevention of progression during hospitalization  Description: INTERVENTIONS:  - Assess and monitor for signs and symptoms of infection  - Monitor lab/diagnostic results  - Monitor all insertion sites, i e  indwelling lines, tubes, and drains  - Monitor endotracheal if appropriate and nasal secretions for changes in amount and color  - [place] appropriate cooling/warming therapies per order  - Administer medications as ordered  - Instruct and encourage patient and family to use good hand hygiene technique  - Identify and instruct in appropriate isolation precautions for identified infection/condition  Outcome: Progressing  Goal: Absence of fever/infection during neutropenic period  Description: INTERVENTIONS:  - Monitor WBC    Outcome: Progressing     Problem: SAFETY ADULT  Goal: Patient will remain free of falls  Description: INTERVENTIONS:  - Educate patient/family on patient safety including physical limitations  - Instruct patient to call for assistance with activity   - Keep Call bell within reach  - Keep bed low and locked with side rails adjusted as appropriate  - Keep care items and personal belongings within reach  - Initiate and maintain comfort rounds  - Offer Toileting in advance of need    Outcome: Progressing     Problem: DISCHARGE PLANNING  Goal: Discharge to home or other facility with appropriate resources  Description: INTERVENTIONS:  - Identify barriers to discharge w/patient and caregiver  - Arrange for needed discharge resources and transportation as appropriate  - Identify discharge learning needs (meds, wound care, etc )  - Refer to Case Management Department for coordinating discharge planning if the patient needs post-hospital services based on physician/advanced practitioner order or complex needs related to functional status, cognitive ability, or social support system  Outcome: Progressing     Problem: POSTPARTUM  Goal: Experiences normal postpartum course  Description: INTERVENTIONS:  - Monitor maternal vital signs  - Assess uterine involution and lochia  Outcome: Progressing  Goal: Appropriate maternal -  bonding  Description: INTERVENTIONS:  - Identify family support  - Assess for appropriate maternal/infant bonding   -Encourage maternal/infant bonding opportunities  - Referral to  or  as needed  Outcome: Progressing  Goal: Establishment of infant feeding pattern  Description: INTERVENTIONS:  - Assess breast/bottle feeding  - Refer to lactation as needed  Outcome: Progressing

## 2022-12-27 VITALS
HEART RATE: 63 BPM | RESPIRATION RATE: 18 BRPM | SYSTOLIC BLOOD PRESSURE: 116 MMHG | HEIGHT: 67 IN | TEMPERATURE: 97.7 F | OXYGEN SATURATION: 98 % | WEIGHT: 241 LBS | DIASTOLIC BLOOD PRESSURE: 79 MMHG | BODY MASS INDEX: 37.83 KG/M2

## 2022-12-27 PROBLEM — Z90.3 H/O GASTRIC SLEEVE: Status: ACTIVE | Noted: 2022-12-27

## 2022-12-27 RX ORDER — ACETAMINOPHEN 325 MG/1
650 TABLET ORAL EVERY 6 HOURS PRN
Refills: 0
Start: 2022-12-27

## 2022-12-27 RX ORDER — DOCUSATE SODIUM 100 MG/1
100 CAPSULE, LIQUID FILLED ORAL 2 TIMES DAILY PRN
Refills: 0
Start: 2022-12-27

## 2022-12-27 RX ADMIN — ACETAMINOPHEN 650 MG: 325 TABLET, FILM COATED ORAL at 04:51

## 2022-12-27 RX ADMIN — ACETAMINOPHEN 650 MG: 325 TABLET, FILM COATED ORAL at 08:43

## 2022-12-27 RX ADMIN — DOCUSATE SODIUM 100 MG: 100 CAPSULE, LIQUID FILLED ORAL at 08:40

## 2022-12-27 NOTE — UTILIZATION REVIEW
NOTIFICATION OF INPATIENT ADMISSION   MATERNITY/DELIVERY AUTHORIZATION REQUEST   SERVICING FACILITY:   [facility] - L&D, , NICU  [address]  Tax ID: [id number]  NPI: [id number]   ATTENDING PROVIDER:  Attending Name and NPI#: Gwenevere Rubinstein, Md [[id number]]  Address: [address] [place] [address]  Phone: [phone]   ADMISSION INFORMATION:  Place of Service: [facility]patient [facility]  [address]  Place of Service Code: 21  Inpatient Admission Date/Time: 22  8:03 AM  Discharge Date/Time: No discharge date for patient encounter  Admitting Diagnosis Code/Description:  Encounter for full-term uncomplicated delivery [Z92]     Mother: Dionne Martin 1987 Estimated Date of Delivery: 23  Delivering clinician: Gwenevere Rubinstein    OB History        4    Para   2    Term   2            AB   2    Living   2       SAB        IAB        Ectopic        Multiple   0    Live Births   2             [place] Name & MRN:   Information for the patient's :  Anabel Crawford [[id number]]     [place] Delivery Information:  Sex: male  Delivered 2022 3:27 PM by Vaginal, Spontaneous; Gestational Age: 43w4d    [place] Measurements:  Weight: 5 lb 9 6 oz (2540 g); Height: 19"    APGAR 1 minute 5 minutes 10 minutes   Totals: 9 9    [place] Birth Information: 30 y o  female MRN: [id number] Unit/Bed#: -01   Birthweight: No birth weight on file  Gestational Age: <None> Delivery Type:    APGARS Totals:        UTILIZATION REVIEW CONTACT:  Rahul Cortes Utilization   Network Utilization Review Department  Phone: [phone]  Fax [phone]  Email: [email or web address]  Contact for approvals/pending authorizations, clinical reviews, and discharge       PHYSICIAN ADVISORY SERVICES:  Medical Necessity Denial & [facility]te[facility]ug[facility]ev[facility] Review  Phone: [phone]  Fax: [phone]  Email: [email or web address]

## 2022-12-27 NOTE — PLAN OF CARE
Problem: PAIN - ADULT  Goal: Verbalizes/displays adequate comfort level or baseline comfort level  Description: Interventions:  - Encourage patient to monitor pain and request assistance  - Assess pain using appropriate pain scale  - Administer analgesics based on type and severity of pain and evaluate response  - Implement non-pharmacological measures as appropriate and evaluate response  - Consider cultural and social influences on pain and pain management  - Notify physician/advanced practitioner if interventions unsuccessful or patient reports new pain  Outcome: Progressing     Problem: INFECTION - ADULT  Goal: Absence or prevention of progression during hospitalization  Description: INTERVENTIONS:  - Assess and monitor for signs and symptoms of infection  - Monitor lab/diagnostic results  - Monitor all insertion sites, i e  indwelling lines, tubes, and drains  - Monitor endotracheal if appropriate and nasal secretions for changes in amount and color  - [place] appropriate cooling/warming therapies per order  - Administer medications as ordered  - Instruct and encourage patient and family to use good hand hygiene technique  - Identify and instruct in appropriate isolation precautions for identified infection/condition  Outcome: Progressing  Goal: Absence of fever/infection during neutropenic period  Description: INTERVENTIONS:  - Monitor WBC    Outcome: Progressing     Problem: SAFETY ADULT  Goal: Patient will remain free of falls  Description: INTERVENTIONS:  - Educate patient/family on patient safety including physical limitations  - Instruct patient to call for assistance with activity   - Keep Call bell within reach  - Keep bed low and locked with side rails adjusted as appropriate  - Keep care items and personal belongings within reach  - Initiate and maintain comfort rounds  - Offer Toileting in advance of need    Outcome: Progressing     Problem: DISCHARGE PLANNING  Goal: Discharge to home or other facility with appropriate resources  Description: INTERVENTIONS:  - Identify barriers to discharge w/patient and caregiver  - Arrange for needed discharge resources and transportation as appropriate  - Identify discharge learning needs (meds, wound care, etc )  - Refer to Case Management Department for coordinating discharge planning if the patient needs post-hospital services based on physician/advanced practitioner order or complex needs related to functional status, cognitive ability, or social support system  Outcome: Progressing     Problem: POSTPARTUM  Goal: Experiences normal postpartum course  Description: INTERVENTIONS:  - Monitor maternal vital signs  - Assess uterine involution and lochia  Outcome: Progressing  Goal: Appropriate maternal -  bonding  Description: INTERVENTIONS:  - Identify family support  - Assess for appropriate maternal/infant bonding   -Encourage maternal/infant bonding opportunities  - Referral to  or  as needed  Outcome: Progressing  Goal: Establishment of infant feeding pattern  Description: INTERVENTIONS:  - Assess breast/bottle feeding  - Refer to lactation as needed  Outcome: Progressing  Goal: Incision(s), wounds(s) or drain site(s) healing without S/S of infection  Description: INTERVENTIONS  - Assess and document dressing, incision, wound bed, drain sites and surrounding tissue  - Provide patient and family education  - Perform skin care/dressing changes every   Outcome: Progressing

## 2022-12-27 NOTE — PLAN OF CARE
Problem: PAIN - ADULT  Goal: Verbalizes/displays adequate comfort level or baseline comfort level  Description: Interventions:  - Encourage patient to monitor pain and request assistance  - Assess pain using appropriate pain scale  - Administer analgesics based on type and severity of pain and evaluate response  - Implement non-pharmacological measures as appropriate and evaluate response  - Consider cultural and social influences on pain and pain management  - Notify physician/advanced practitioner if interventions unsuccessful or patient reports new pain  Outcome: Progressing     Problem: INFECTION - ADULT  Goal: Absence or prevention of progression during hospitalization  Description: INTERVENTIONS:  - Assess and monitor for signs and symptoms of infection  - Monitor lab/diagnostic results  - Monitor all insertion sites, i e  indwelling lines, tubes, and drains  - Monitor endotracheal if appropriate and nasal secretions for changes in amount and color  - [place] appropriate cooling/warming therapies per order  - Administer medications as ordered  - Instruct and encourage patient and family to use good hand hygiene technique  - Identify and instruct in appropriate isolation precautions for identified infection/condition  Outcome: Progressing  Goal: Absence of fever/infection during neutropenic period  Description: INTERVENTIONS:  - Monitor WBC    Outcome: Progressing     Problem: SAFETY ADULT  Goal: Patient will remain free of falls  Description: INTERVENTIONS:  - Educate patient/family on patient safety including physical limitations  - Instruct patient to call for assistance with activity   - Keep Call bell within reach  - Keep bed low and locked with side rails adjusted as appropriate  - Keep care items and personal belongings within reach  - Initiate and maintain comfort rounds  - Offer Toileting in advance of need    Outcome: Progressing     Problem: DISCHARGE PLANNING  Goal: Discharge to home or other facility with appropriate resources  Description: INTERVENTIONS:  - Identify barriers to discharge w/patient and caregiver  - Arrange for needed discharge resources and transportation as appropriate  - Identify discharge learning needs (meds, wound care, etc )  - Refer to Case Management Department for coordinating discharge planning if the patient needs post-hospital services based on physician/advanced practitioner order or complex needs related to functional status, cognitive ability, or social support system  Outcome: Progressing     Problem: POSTPARTUM  Goal: Experiences normal postpartum course  Description: INTERVENTIONS:  - Monitor maternal vital signs  - Assess uterine involution and lochia  Outcome: Progressing  Goal: Appropriate maternal -  bonding  Description: INTERVENTIONS:  - Identify family support  - Assess for appropriate maternal/infant bonding   -Encourage maternal/infant bonding opportunities  - Referral to  or  as needed  Outcome: Progressing  Goal: Establishment of infant feeding pattern  Description: INTERVENTIONS:  - Assess breast/bottle feeding  - Refer to lactation as needed  Outcome: Progressing  Goal: Incision(s), wounds(s) or drain site(s) healing without S/S of infection  Description: INTERVENTIONS  - Assess and document dressing, incision, wound bed, drain sites and surrounding tissue  - Provide patient and family education    Outcome: Progressing

## 2022-12-27 NOTE — PROGRESS NOTES
Progress Note - OB/GYN  Kelly Mayo 30 y o  female MRN: [id number]  Unit/Bed#: -01 Encounter: [id number]    Assessment and Plan:  Kelly Mayo is a patient of: [address] [place] [address] by default  She is PPD# 2 s/p  spontaneous vaginal delivery  Recovering well and is stable  By issue:      *  (spontaneous vaginal delivery)  Assessment & Plan  Meeting postpartum milestones  Pain well controlled  Voiding spontaneously  Appropriate bowel function  Tolerating regular diet  Lochia wnl  Breastfeeding  · Continue routine postpartum care    Hx of preeclampsia, prior pregnancy, currently pregnant  Assessment & Plan  History of postpartum preeclampsia with severe features in 1st pregnancy  Per patient, normotensive this pregnancy  Asymptomatic  CBC, CMP, and P:C wnl on admission  Normotensive this admission  · Continue routine BP monitoring      Disposition    - Anticipate discharge home on PPD# 2      Subjective/Objective     Chief Complaint: Postpartum State     Subjective:    Kelly Mayo is PPD#2 s/p  spontaneous vaginal delivery  She has no current complaints  Pain is well controlled  Patient is currently voiding  She is ambulating  Patient is currently passing flatus and has had bowel movement  She is tolerating PO, and denies nausea or vomitting  Patient denies fever, chills, chest pain, shortness of breath, or calf tenderness  Lochia is normal  She is  Breastfeeding  She is recovering well and is stable         Vitals:   /63   Pulse 59   Temp 98 °F (36 7 °C) (Oral)   Resp 16   Ht 5' 7" (1 702 m)   Wt 109 kg (241 lb)   LMP 2022   SpO2 98%   Breastfeeding Yes   BMI 37 75 kg/m²     No intake or output data in the 24 hours ending 22 0650    Invasive Devices     None                 Physical Exam:   GEN: Kelly Mayo appears well, alert and oriented x 3, pleasant and cooperative   CARDIO: RRR, no murmurs or rubs  RESP:  CTAB, no wheezes or rales  ABDOMEN: soft, no tenderness, no distention, fundus @ U -2  EXTREMITIES: SCDs on, non tender, no erythema, b/l Silverio's sign negative      Labs:     Hemoglobin   Date Value Ref Range Status   12/25/2022 10 6 (L) 11 5 - 15 4 g/dL Final     WBC   Date Value Ref Range Status   12/25/2022 9 84 4 31 - 10 16 Thousand/uL Final     Platelets   Date Value Ref Range Status   12/25/2022 279 149 - 390 Thousands/uL Final     Creatinine   Date Value Ref Range Status   12/25/2022 0 67 0 60 - 1 30 mg/dL Final     Comment:     Standardized to IDMS reference method     AST   Date Value Ref Range Status   12/25/2022 26 13 - 39 U/L Final     Comment:     Specimen collection should occur prior to Sulfasalazine administration due to the potential for falsely depressed results  ALT   Date Value Ref Range Status   12/25/2022 24 7 - 52 U/L Final     Comment:     Specimen collection should occur prior to Sulfasalazine administration due to the potential for falsely depressed results             Rogelio Lopez MD  12/27/2022  6:50 AM

## 2023-01-01 LAB — PLACENTA IN STORAGE: NORMAL

## 2023-01-04 NOTE — PROGRESS NOTES
Post-Partum Checkup Visit    Luis Alfredo Ceja is a 30 y o  [id number] female here for BP check  Assessment:  Delivered a male  (5lb 9 6oz) via  , doing well today  BP of 134/96 without symptoms or signs of preeclampsia  Plan:  1  Breastfeeding   - yes  2  Depression score   - Score 3  4  H/o preeclampsia w/SF postpartum   - Reviewed PreE warning signs   - Patient currently denying signs of preeclampsia   - Follow up in 2 weeks  Problem List Items Addressed This Visit    None      Subjective/Objective     Subjective:     Patient endorses improvement in swelling and overall just feeling tired from being up with   She reports having a headache 1 week ago and took her blood pressure at home and it was 158/90s, which decreased after drinking water  Her headache has since resolved  Pain: no  Tolerating Oral Intake: yes  Voiding: yes  Flatus: yes  Bowel Movement: yes  Ambulating: yes  Breastfeeding: Breastfeeding  Chest Pain: no  Shortness of Breath: no  Leg Pain/Discomfort: no  Lochia: normal     Review of Systems   Review of Systems   Constitutional: Negative for chills and fever  HENT: Negative for ear pain and sore throat  Eyes: Negative for pain and visual disturbance  Respiratory: Negative for cough and shortness of breath  Cardiovascular: Negative for chest pain and palpitations  Gastrointestinal: Negative for abdominal pain and vomiting  Genitourinary: Negative for dysuria and hematuria  Musculoskeletal: Negative for arthralgias and back pain  Skin: Negative for color change and rash  Neurological: Negative for seizures and syncope  All other systems reviewed and are negative  Objective:   Vitals:  Vitals:    23 0951   BP: 134/96   Pulse: 60       Physical Exam:  Physical Exam  Vitals and nursing note reviewed  Constitutional:       General: She is not in acute distress  Appearance: Normal appearance  She is well-developed     HENT:      Head: Normocephalic and atraumatic  Eyes:      Conjunctiva/sclera: Conjunctivae normal    Cardiovascular:      Pulses: Normal pulses  Pulmonary:      Effort: Pulmonary effort is normal  No respiratory distress  Abdominal:      Palpations: Abdomen is soft  Tenderness: There is no abdominal tenderness  Musculoskeletal:         General: No swelling  Cervical back: Neck supple  Skin:     General: Skin is warm and dry  Neurological:      Mental Status: She is alert  Psychiatric:         Mood and Affect: Mood normal          Behavior: Behavior normal            OB Hx:  OB History    Para Term  AB Living   4 2 2 0 2 2   SAB IAB Ectopic Multiple Live Births   0 0 0 0 2      # Outcome Date GA Lbr Oracio/2nd Weight Sex Delivery Anes PTL Lv   4 Term 22 38w1d / 00:12 2540 g (5 lb 9 6 oz) M Vag-Spont EPI N ROGERS      [facility]      Apgar1: 9  Apgar5: 9   3 AB 21           2 Term 14    F Vag-Spont EPI N ROGERS      Complications: Pre-eclampsia   1 AB 2004     TAB        Medical Hx  Past Medical History:   Diagnosis Date   • Pre-eclampsia, severe, postpartum condition        • Varicella      Surgical Hx  Past Surgical History:   Procedure Laterality Date   • CHOLECYSTECTOMY     • CYSTECTOMY Left    • DISCECTOMY SPINE LUMBAR W/ ARTHROPLASTY     • GASTRECTOMY SLEEVE LAPAROSCOPIC           Family Hx  No family history on file    Social Hx  Social History     Socioeconomic History   • Marital status: /Civil Union     Spouse name: Not on file   • Number of children: Not on file   • Years of education: Not on file   • Highest education level: Not on file   Occupational History   • Not on file   Tobacco Use   • Smoking status: Former     Types: Cigarettes   • Smokeless tobacco: Never   Substance and Sexual Activity   • Alcohol use: Not Currently   • Drug use: Yes     Types: Marijuana   • Sexual activity: Yes   Other Topics Concern   • Not on file   Social History Narrative   • Not on file     Social Determinants of Health     Financial Resource Strain: Not on file   Food Insecurity: Not on file   Transportation Needs: Not on file   Physical Activity: Not on file   Stress: Not on file   Social Connections: Not on file   Intimate Partner Violence: Not on file   Housing Stability: Not on file     Allergies  Allergies   Allergen Reactions   • Ceftriaxone Anaphylaxis     Meds    Current Outpatient Medications:   •  acetaminophen (TYLENOL) 325 mg tablet, Take 2 tablets (650 mg total) by mouth every 6 (six) hours as needed for mild pain or moderate pain, Disp: , Rfl: 0  •  docusate sodium (COLACE) 100 mg capsule, Take 1 capsule (100 mg total) by mouth 2 (two) times a day as needed for constipation, Disp: , Rfl: 0  •  ferrous sulfate 325 (65 Fe) mg tablet, Take 325 mg by mouth daily with breakfast, Disp: , Rfl:   •  Prenatal MV-Min-Fe Fum-FA-DHA (PRENATAL 1 PO), Take 1 tablet by mouth, Disp: , Rfl:   •  psyllium (METAMUCIL) 58 6 % powder, Take 1 packet by mouth 3 (three) times a day, Disp: , Rfl:     Nupur Raphael MD  OB/GYN PGY-2  1/5/2023  10:27 AM

## 2023-01-05 ENCOUNTER — OFFICE VISIT (OUTPATIENT)
Dept: OBGYN CLINIC | Facility: CLINIC | Age: 38
End: 2023-01-05

## 2023-01-05 VITALS
HEART RATE: 60 BPM | BODY MASS INDEX: 37.35 KG/M2 | WEIGHT: 238 LBS | DIASTOLIC BLOOD PRESSURE: 96 MMHG | HEIGHT: 67 IN | SYSTOLIC BLOOD PRESSURE: 134 MMHG

## 2023-01-05 DIAGNOSIS — Z01.30 BLOOD PRESSURE CHECK: Primary | ICD-10-CM

## 2023-01-05 RX ORDER — DIAPER,BRIEF,ADULT, DISPOSABLE
1 EACH MISCELLANEOUS
COMMUNITY

## 2023-01-24 ENCOUNTER — POSTPARTUM VISIT (OUTPATIENT)
Dept: OBGYN CLINIC | Facility: CLINIC | Age: 38
End: 2023-01-24

## 2023-01-24 VITALS
HEIGHT: 67 IN | DIASTOLIC BLOOD PRESSURE: 85 MMHG | SYSTOLIC BLOOD PRESSURE: 119 MMHG | BODY MASS INDEX: 37.57 KG/M2 | HEART RATE: 64 BPM | WEIGHT: 239.4 LBS

## 2023-01-24 RX ORDER — PRENATAL VIT/IRON FUM/FOLIC AC 27MG-0.8MG
TABLET ORAL
COMMUNITY

## 2023-01-24 RX ORDER — DOXYLAMINE SUCCINATE AND PYRIDOXINE HYDROCHLORIDE, DELAYED RELEASE TABLETS 10 MG/10 MG 10; 10 MG/1; MG/1
TABLET, DELAYED RELEASE ORAL
COMMUNITY
Start: 2022-09-27

## 2023-01-24 NOTE — PROGRESS NOTES
Post-Partum Checkup Visit    Pal Hernandez is a 30 y o  [id number] female     Assessment:  Delivered a male  (5lb 9 6oz) via  , doing well today  Plan:  1  Breastfeeding   - Breast and supplemental feeding  2  Contraception   - declines  3  Depression score   - Score 3 on last visit, unchanged  4  H/o PreE w/SF postpartum   Normotensive at home   BP of 119/85 on admission    Problem List Items Addressed This Visit    None      Subjective/Objective     Subjective:     She states she has been bleeding off and on, but overall improving  She denies headaches anymore  Her BPs at home are 110s/70s  She is taking her vitamins and drinking a lot of water  She states that she has plenty of support  She doesn't want contraception  Her  might get vasectomy  Pain: no  Tolerating Oral Intake: yes  Voiding: yes  Flatus: yes  Bowel Movement: yes  Ambulating: yes  Breastfeeding: Breastfeeding and Bottle feeding  Chest Pain: no  Shortness of Breath: no  Leg Pain/Discomfort: no  Lochia: normal     Review of Systems   Review of Systems   Constitutional: Negative for chills and fever  HENT: Negative for ear pain and sore throat  Eyes: Negative for pain and visual disturbance  Respiratory: Negative for cough and shortness of breath  Cardiovascular: Negative for chest pain and palpitations  Gastrointestinal: Negative for abdominal pain and vomiting  Genitourinary: Negative for dysuria and hematuria  Musculoskeletal: Negative for arthralgias and back pain  Skin: Negative for color change and rash  Neurological: Negative for seizures and syncope  All other systems reviewed and are negative  Objective:   Vitals:  Vitals:    23 1424   BP: 119/85   Pulse: 64       Physical Exam:  Physical Exam  Vitals and nursing note reviewed  Constitutional:       General: She is not in acute distress  Appearance: She is well-developed  HENT:      Head: Normocephalic and atraumatic     Eyes: Conjunctiva/sclera: Conjunctivae normal    Cardiovascular:      Rate and Rhythm: Normal rate  Pulses: Normal pulses  Pulmonary:      Effort: Pulmonary effort is normal  No respiratory distress  Abdominal:      Palpations: Abdomen is soft  Tenderness: There is no abdominal tenderness  Musculoskeletal:         General: No swelling  Cervical back: Neck supple  Skin:     General: Skin is warm and dry  Neurological:      Mental Status: She is alert  Psychiatric:         Mood and Affect: Mood normal          Behavior: Behavior normal            OB Hx:  OB History    Para Term  AB Living   4 2 2 0 2 2   SAB IAB Ectopic Multiple Live Births   0 0 0 0 2      # Outcome Date GA Lbr Oracio/2nd Weight Sex Delivery Anes PTL Lv   4 Term 22 38w1d / 00:12 2540 g (5 lb 9 6 oz) M Vag-Spont EPI N ROGERS      [facility]      Apgar1: 9  Apgar5: 9   3 AB 21           2 Term 14    F Vag-Spont EPI N ROGERS      Complications: Pre-eclampsia   1 AB 2004     TAB        Medical Hx  Past Medical History:   Diagnosis Date   • Pre-eclampsia, severe, postpartum condition        • Varicella      Surgical Hx  Past Surgical History:   Procedure Laterality Date   • CHOLECYSTECTOMY     • CYSTECTOMY Left    • DISCECTOMY SPINE LUMBAR W/ ARTHROPLASTY     • GASTRECTOMY SLEEVE LAPAROSCOPIC           Family Hx  No family history on file    Social Hx  Social History     Socioeconomic History   • Marital status: /Civil Union     Spouse name: Not on file   • Number of children: Not on file   • Years of education: Not on file   • Highest education level: Not on file   Occupational History   • Not on file   Tobacco Use   • Smoking status: Never   • Smokeless tobacco: Never   Vaping Use   • Vaping Use: Never used   Substance and Sexual Activity   • Alcohol use: Not Currently   • Drug use: Never     Types: Marijuana   • Sexual activity: Not Currently     Partners: Male Birth control/protection: None   Other Topics Concern   • Not on file   Social History Narrative   • Not on file     Social Determinants of Health     Financial Resource Strain: Low Risk    • Difficulty of Paying Living Expenses: Not hard at all   Food Insecurity: No Food Insecurity   • Worried About [address] in the Last Year: Never true   • Ran Out of Food in the Last Year: Never true   Transportation Needs: No Transportation Needs   • Lack of Transportation (Medical): No   • Lack of Transportation (Non-Medical): No   Physical Activity: Not on file   Stress: Not on file   Social Connections: Not on file   Intimate Partner Violence: Not on file   Housing Stability: Not on file     Allergies  Allergies   Allergen Reactions   • Ceftriaxone Anaphylaxis     Meds    Current Outpatient Medications:   •  acetaminophen (TYLENOL) 325 mg tablet, Take 2 tablets (650 mg total) by mouth every 6 (six) hours as needed for mild pain or moderate pain, Disp: , Rfl: 0  •  docusate sodium (COLACE) 100 mg capsule, Take 1 capsule (100 mg total) by mouth 2 (two) times a day as needed for constipation, Disp: , Rfl: 0  •  ferrous sulfate 325 (65 Fe) mg tablet, Take 325 mg by mouth daily with breakfast, Disp: , Rfl:   •  Lecithin 1200 MG CAPS, Take 1 capsule by mouth, Disp: , Rfl:   •  Prenatal MV-Min-Fe Fum-FA-DHA (PRENATAL 1 PO), Take 1 tablet by mouth, Disp: , Rfl:   •  Doxylamine-Pyridoxine 10-10 MG TBEC, * Take 2 tabs PO HS; contin if controls sympt   * If sympt persist, 2 tabs HS then 3 tabs starting Day 3 (1 tab AM + 2 tabs HS) & continue if sympt controlled * If 3 tabs Day 3 does not control sympt, 4 tabs starting Day 4 (1 AM, 1 mid-afternoon, 2 HS) Indications: Nausea and Vomiting in Pregnancy (Patient not taking: Reported on 1/24/2023), Disp: , Rfl:   •  prenatal vitamin (CLASSIC FORMULA) 27-0 8 mg, Take by mouth, Disp: , Rfl:   •  psyllium (METAMUCIL) 58 6 % powder, Take 1 packet by mouth 3 (three) times a day (Patient not taking: Reported on 1/5/2023), Disp: , Rfl:     Nupur Hawthorne MD  OB/GYN PGY-2  1/24/2023  2:36 PM

## 2023-04-19 PROBLEM — O09.299 HX OF PREECLAMPSIA, PRIOR PREGNANCY, CURRENTLY PREGNANT: Status: RESOLVED | Noted: 2022-12-25 | Resolved: 2023-04-19

## 2023-04-19 PROBLEM — F41.8 POSTPARTUM ANXIETY: Status: ACTIVE | Noted: 2023-04-19

## 2023-05-10 DIAGNOSIS — Z30.011 ENCOUNTER FOR ORAL CONTRACEPTION INITIAL PRESCRIPTION: ICD-10-CM

## 2023-05-10 RX ORDER — ACETAMINOPHEN AND CODEINE PHOSPHATE 120; 12 MG/5ML; MG/5ML
SOLUTION ORAL
Qty: 84 TABLET | Refills: 2 | Status: SHIPPED | OUTPATIENT
Start: 2023-05-10

## 2023-10-23 ENCOUNTER — ANNUAL EXAM (OUTPATIENT)
Dept: OBGYN CLINIC | Facility: CLINIC | Age: 38
End: 2023-10-23

## 2023-10-23 VITALS
DIASTOLIC BLOOD PRESSURE: 65 MMHG | HEIGHT: 67 IN | HEART RATE: 71 BPM | SYSTOLIC BLOOD PRESSURE: 131 MMHG | BODY MASS INDEX: 42.22 KG/M2 | WEIGHT: 269 LBS

## 2023-10-23 DIAGNOSIS — Z30.41 ENCOUNTER FOR SURVEILLANCE OF CONTRACEPTIVE PILLS: ICD-10-CM

## 2023-10-23 DIAGNOSIS — Z01.419 WELL WOMAN EXAM WITH ROUTINE GYNECOLOGICAL EXAM: Primary | ICD-10-CM

## 2023-10-23 PROCEDURE — G0476 HPV COMBO ASSAY CA SCREEN: HCPCS | Performed by: OBSTETRICS & GYNECOLOGY

## 2023-10-23 PROCEDURE — G0145 SCR C/V CYTO,THINLAYER,RESCR: HCPCS | Performed by: OBSTETRICS & GYNECOLOGY

## 2023-10-23 RX ORDER — ACETAMINOPHEN AND CODEINE PHOSPHATE 120; 12 MG/5ML; MG/5ML
1 SOLUTION ORAL DAILY
Qty: 84 TABLET | Refills: 2 | Status: SHIPPED | OUTPATIENT
Start: 2023-10-23

## 2023-10-23 RX ORDER — ASCORBIC ACID 500 MG
500 TABLET ORAL 2 TIMES DAILY
COMMUNITY
Start: 2023-10-14 | End: 2024-10-13

## 2023-10-23 NOTE — ASSESSMENT & PLAN NOTE
-Cervical cancer screening: co-testing collected today  -STI testing declined due to monogamous relationship  -Contraception: currently on the mini-pill. Advised that with upcoming bypass surgery for weight loss, the mini-pill could lose its efficacy.  She is not interested in switching to an alternate method at this time because her  is in the process of getting a vasectomy  -Return in 1 for annual exam or sooner as needed

## 2023-10-23 NOTE — PROGRESS NOTES
[facility] Annual Visit     Jesenia Alston is a 41 y.o. [id number] with normal gynecologic exam.    Problem List Items Addressed This Visit          Other    Well woman exam with routine gynecological exam - Primary     -Cervical cancer screening: co-testing collected today  -STI testing declined due to monogamous relationship  -Contraception: currently on the mini-pill. Advised that with upcoming bypass surgery for weight loss, the mini-pill could lose its efficacy. She is not interested in switching to an alternate method at this time because her  is in the process of getting a vasectomy  -Return in 1 for annual exam or sooner as needed           Relevant Orders    Mammo screening bilateral w 3d & cad    Liquid-based pap, screening     Other Visit Diagnoses       Encounter for surveillance of contraceptive pills        Relevant Medications    norethindrone (MICRONOR) 0.35 MG tablet            Discussed with Dr. Denise Hines. Alessandra Brown MD  PGY-IV, OB/GYN    Subjective:   Jesenia Alston is a 41 y.o. [id number] who presents for annual well woman exam.   Complaints/concerns today: none. Having bariatric surgery later this week    Review of Systems  Denies vaginal discharge, labial erythema or lesions, dyspareunia  Periods are regular. No intermenstrual bleeding or spotting  Sexually active: with 1 male partner. Current contraception: oral progesterone-only contraceptive  No urinary or fecal incontinence. No bulge complaints   No breast tenderness, lumps or skin changes. Has left axillary breast tissue but no complaints regarding this area  Family history of breast, endometrial, uterine, ovarian or colon cancers: yes - maternal grandmother had breast cancer in her mid 62s. Her half brother had colon and neuroendocrine cancer but this was inherited from his father's side of the family and they have different fathers.  He unfortunately passed in his 45s    [facility] Maintenance: Last cervical cancer screening: none on file. History of abnormal cervical cancer screening: yes, HPV positive years ago  Last mammogram: none on file. No history of abnormal mammograms  Minimal exercise  She feels safe at home  She does follows a balanced diet   She does not use tobacco or illicit drugs    Depression Screening: Patient's depression screening was significant for a PHQ-2 score of . Their PHQ-9 score was 1. Postpartum anxiety has resolved. OB History    Para Term  AB Living   4 2 2   2 2   SAB IAB Ectopic Multiple Live Births         0 2      # Outcome Date GA Lbr Oracio/2nd Weight Sex Delivery Anes PTL Lv   4 Term 22 38w1d / 00:12 2540 g (5 lb 9.6 oz) M Vag-Spont EPI N ROGERS   3 AB 21           2 Term 14    F Vag-Spont EPI N ROGERS      Complications: Pre-eclampsia   1 AB 2004     TAB          Past Medical History:   Diagnosis Date    Hx of preeclampsia, prior pregnancy, currently pregnant 2022    Pre-eclampsia, severe, postpartum condition     2014    Varicella        Past Surgical History:   Procedure Laterality Date    CHOLECYSTECTOMY      CYSTECTOMY Left     DISCECTOMY SPINE LUMBAR W/ ARTHROPLASTY      GASTRECTOMY SLEEVE LAPAROSCOPIC             History reviewed. No pertinent family history. Objective:  /65   Pulse 71   Ht 5' 7" (1.702 m)   Wt 122 kg (269 lb)   LMP 10/21/2023   BMI 42.13 kg/m²  Body mass index is 42.13 kg/m². Physical Exam:  GEN: The patient was alert and oriented x3, pleasant well-appearing female in no acute distress. HEENT:  Unremarkable, no anterior or posterior lymphadenopathy, no thyromegaly  CV:  Regular rate and rhythm  RESP:  Unlabored breathing, CTA bilaterally  BREAST:  Symmetric breasts with no palpable breast masses or obvious breast lesions. She has no retractions or nipple discharge. She has no axillary abnormalities or palpable masses.    GI:  Soft, nontender, non-distended  MSK: bilateral lower extremities are nontender, no edema  : Normal appearing external female genitalia, normal appearing urethral meatus. On sterile speculum exam,  normal appearing vaginal epithelium, no prolapse, dark red menstrual bleeding, grossly normal appearing cervix. On bimanual exam,  no cervical motion tenderness; uterine exam on bimanual exam is limited due to habitus but not palpable abnormalities noted. No tenderness or fullness in the bilateral adnexa.

## 2023-10-24 LAB
HPV HR 12 DNA CVX QL NAA+PROBE: NEGATIVE
HPV16 DNA CVX QL NAA+PROBE: NEGATIVE
HPV18 DNA CVX QL NAA+PROBE: NEGATIVE

## 2023-10-26 LAB
LAB AP GYN PRIMARY INTERPRETATION: NORMAL
Lab: NORMAL

## 2023-10-27 NOTE — RESULT ENCOUNTER NOTE
Normal co-testing. Do It In Person message sent. Alessandra Dupree MD  PGY-IV, OB/GYN  10/27/2023, 10:56 AM

## 2023-12-22 PROBLEM — Z01.419 WELL WOMAN EXAM WITH ROUTINE GYNECOLOGICAL EXAM: Status: RESOLVED | Noted: 2023-10-23 | Resolved: 2023-12-22

## 2024-02-25 ENCOUNTER — HOSPITAL ENCOUNTER (EMERGENCY)
Facility: HOSPITAL | Age: 39
Discharge: HOME | End: 2024-02-25
Attending: EMERGENCY MEDICINE | Admitting: EMERGENCY MEDICINE

## 2024-02-25 VITALS
RESPIRATION RATE: 18 BRPM | TEMPERATURE: 97.3 F | DIASTOLIC BLOOD PRESSURE: 60 MMHG | HEART RATE: 82 BPM | SYSTOLIC BLOOD PRESSURE: 104 MMHG | OXYGEN SATURATION: 98 %

## 2024-02-25 DIAGNOSIS — H10.30 ACUTE CONJUNCTIVITIS: ICD-10-CM

## 2024-02-25 DIAGNOSIS — R68.89 FLU-LIKE SYMPTOMS: Primary | ICD-10-CM

## 2024-02-25 LAB
FLUAV RNA RESP QL NAA+PROBE: NEGATIVE
FLUBV RNA RESP QL NAA+PROBE: NEGATIVE
RSV RNA RESP QL NAA+PROBE: NEGATIVE
SARS-COV-2 RNA RESP QL NAA+PROBE: NEGATIVE

## 2024-02-25 PROCEDURE — 99284 EMERGENCY DEPT VISIT MOD MDM: CPT | Performed by: EMERGENCY MEDICINE

## 2024-02-25 PROCEDURE — 0241U HB NFCT DS VIR RESP RNA 4 TRGT: CPT

## 2024-02-25 PROCEDURE — 99283 EMERGENCY DEPT VISIT LOW MDM: CPT

## 2024-02-25 RX ORDER — ERYTHROMYCIN 5 MG/G
OINTMENT OPHTHALMIC ONCE
Status: COMPLETED | OUTPATIENT
Start: 2024-02-25 | End: 2024-02-25

## 2024-02-25 RX ADMIN — ERYTHROMYCIN: 5 OINTMENT OPHTHALMIC at 14:55

## 2024-02-25 NOTE — ED ATTENDING ATTESTATION
2/25/2024  IKatherin, DO, saw and evaluated the patient. I have discussed the patient with the resident/non-physician practitioner and agree with the resident's/non-physician practitioner's findings, Plan of Care, and MDM as documented in the resident's/non-physician practitioner's note, except where noted. All available labs and Radiology studies were reviewed.  I was present for key portions of any procedure(s) performed by the resident/non-physician practitioner and I was immediately available to provide assistance.       At this point I agree with the current assessment done in the Emergency Department.  I have conducted an independent evaluation of this patient a history and physical is as follows:    ED Course   39-year-old female presents the emergency department for evaluation of URI symptoms of cough, runny nose, sore throat and right eye irritation/pain.  Patient is here with her daughter, significant other and 14-month-old son who also have similar symptoms of eye irritation and URI symptoms.  Patient states her [id number]-[id number] son became ill approximately 2 weeks ago.  Since that time he has had eye drainage.  Patient states that she first noticed eye redness and discomfort in her right eye yesterday.  She has also been having 5 days of chest congestion, sore throat and runny nose.  No nausea, vomiting, diarrhea.  No fevers.  The patient did receive a flu shot this year.  Patient is eating and drinking well    Past medical history: Tonsillectomy    Physical exam: Patient is awake and alert, no acute distress.  Resting comfortably.  Pupils are equal and reactive.  Neck is supple without JVD.  Pharynx with erythema.  No exudate.  Tonsils are absent.  Left eye conjunctiva is erythematous, scant clear drainage noted.  Heart is regular rate and rhythm without ectopy.  Lungs are clear to auscultation, no wheeze or rhonchi.  Chest wall is nontender to palpation.  Abdomen is soft, nontender, nondistended with  normal active bowel sounds.  No lower extremity edema.  No focal motor or sensory deficits.  Speech is clear and appropriate    Assessment/plan: 39-year-old female presents with 4-day history of URI symptoms and 1 day history of left eye irritation, family members with similar symptoms.  Will test for COVID/flu/RSV.  Will treat empirically for acute conjunctivitis with topical antibiotics.  Patient is nontoxic and afebrile with a benign physical exam.  Will discharge pending viral panel.  Discussed signs and symptoms to return to the emergency department.  Critical Care Time  Procedures

## 2024-02-25 NOTE — DISCHARGE INSTRUCTIONS
You were evaluated in the emergency department for: Cold-like symptoms and eye redness. You can access your current and pending results through [employer].    - You should follow-up with your primary care provider, as soon as possible, for re-evaluation.  - You are being discharged with Erythromycin ointment, please apply a thin ribbon to the affected eye every 4 hours while awake for the next 4-5 days.    Please, return to the emergency department if you experience new or worsening symptoms, fever, chest pain, shortness of breath, difficulty breathing, dizziness, abdominal pain, persistent nausea/vomiting, syncope or passing out, blood in your urine or stool, coughing up blood, leg swelling/pain, urinary retention, bowel or bladder incontinence, numbness between your legs.

## 2024-02-25 NOTE — ED PROVIDER NOTES
History  Chief Complaint   Patient presents with    Eye Redness     Patient reports right eye redness, first noticed yesterday.     Cold Like Symptoms     Patient also reports chest congestion, sore throat, runny nose, since Tuesday. Reports son was just seen here     HPI  40 yo female with history of pre-eclampsia, allergy to Ceftriaxone presenting for URI symptoms.    Patient report about 5 days of URI symptoms including cough, rhinorrhea, congestion, sore throat and right eye irritation. Patient presents with the rest of her family of 4, all with similar symptoms, starting with 14 month old son whose symptoms started about 2 weeks ago. She denies fevers, chills, no nausea, no vomiting, no chest pain, abdominal pain, dysuria, frequency, diarrhea. Able to eat and drink without issues. Received flu shot this year.    Prior to Admission Medications   Prescriptions Last Dose Informant Patient Reported? Taking?   Cholecalciferol 1.25 MG (02735 UT) capsule   Yes No   Sig: Take 1 capsule by mouth once a week   Doxylamine-Pyridoxine 10-10 MG TBEC   Yes No   Sig: * Take 2 tabs PO HS; contin if controls sympt. * If sympt persist, 2 tabs HS then 3 tabs starting Day 3 (1 tab AM + 2 tabs HS) & continue if sympt controlled * If 3 tabs Day 3 does not control sympt, 4 tabs starting Day 4 (1 AM, 1 mid-afternoon, 2 HS) Indications: Nausea and Vomiting in Pregnancy   Patient not taking: Reported on 1/24/2023   Lecithin 1200 MG CAPS  Self Yes No   Sig: Take 1 capsule by mouth   Patient not taking: Reported on 4/20/2023   Prenatal MV-Min-Fe Fum-FA-DHA (PRENATAL 1 PO)  Self Yes No   Sig: Take 1 tablet by mouth   Patient not taking: Reported on 10/23/2023   acetaminophen (TYLENOL) 325 mg tablet  Self No No   Sig: Take 2 tablets (650 mg total) by mouth every 6 (six) hours as needed for mild pain or moderate pain   Patient not taking: Reported on 10/23/2023   ascorbic acid (VITAMIN C) 500 MG tablet   Yes No   Sig: Take 500 mg by mouth 2  (two) times a day   docusate sodium (COLACE) 100 mg capsule  Self No No   Sig: Take 1 capsule (100 mg total) by mouth 2 (two) times a day as needed for constipation   Patient not taking: Reported on 4/20/2023   ferrous sulfate 325 (65 Fe) mg tablet  Self Yes No   Sig: Take 325 mg by mouth daily with breakfast   Patient not taking: Reported on 4/20/2023   norethindrone (MICRONOR) 0.35 MG tablet   No No   Sig: Take 1 tablet (0.35 mg total) by mouth daily   prenatal vitamin (CLASSIC FORMULA) 27-0.8 mg   Yes No   Sig: Take by mouth   Patient not taking: Reported on 4/20/2023   psyllium (METAMUCIL) 58.6 % powder  Self Yes No   Sig: Take 1 packet by mouth 3 (three) times a day   Patient not taking: Reported on 1/5/2023   sertraline (ZOLOFT) 100 mg tablet   No No   Sig: Take 0.5 tablets (50 mg total) by mouth daily   Patient not taking: Reported on 10/23/2023      Facility-Administered Medications: None       Past Medical History:   Diagnosis Date    Hx of preeclampsia, prior pregnancy, currently pregnant 12/25/2022    Pre-eclampsia, severe, postpartum condition     2014    Varicella        Past Surgical History:   Procedure Laterality Date    CHOLECYSTECTOMY      CYSTECTOMY Left     DISCECTOMY SPINE LUMBAR W/ ARTHROPLASTY      GASTRECTOMY SLEEVE LAPAROSCOPIC      2015       History reviewed. No pertinent family history.  I have reviewed and agree with the history as documented.    E-Cigarette/Vaping    E-Cigarette Use Never User      E-Cigarette/Vaping Substances    Nicotine No     THC No     CBD No     Flavoring No     Other No     Unknown No      Social History     Tobacco Use    Smoking status: Never    Smokeless tobacco: Never   Vaping Use    Vaping status: Never Used   Substance Use Topics    Alcohol use: Not Currently    Drug use: Never     Types: Marijuana        Review of Systems   Constitutional:  Negative for chills and fever.   HENT:  Positive for congestion, rhinorrhea, sneezing and sore throat. Negative for  drooling and hearing loss.    Eyes:  Negative for pain and visual disturbance.   Respiratory:  Positive for cough. Negative for shortness of breath.    Cardiovascular:  Negative for chest pain, palpitations and leg swelling.   Gastrointestinal:  Negative for abdominal pain, constipation, diarrhea, nausea and vomiting.   Genitourinary:  Negative for dysuria, frequency and hematuria.   Musculoskeletal:  Negative for arthralgias and back pain.   Skin:  Negative for color change and rash.   Neurological:  Negative for dizziness, seizures, syncope, weakness, light-headedness and headaches.   Psychiatric/Behavioral:  Negative for dysphoric mood.    All other systems reviewed and are negative.      Physical Exam  ED Triage Vitals [02/25/24 1326]   Temperature Pulse Respirations Blood Pressure SpO2   (!) 97.3 °F (36.3 °C) 82 18 104/60 98 %      Temp Source Heart Rate Source Patient Position - Orthostatic VS BP Location FiO2 (%)   Oral Monitor Sitting Right arm --      Pain Score       5             Orthostatic Vital Signs  Vitals:    02/25/24 1326   BP: 104/60   Pulse: 82   Patient Position - Orthostatic VS: Sitting       Physical Exam  Vitals and nursing note reviewed.   Constitutional:       General: She is not in acute distress.     Appearance: She is well-developed.   HENT:      Head: Normocephalic and atraumatic.      Right Ear: Tympanic membrane, ear canal and external ear normal.      Left Ear: Tympanic membrane, ear canal and external ear normal.      Nose: Congestion and rhinorrhea present.      Mouth/Throat:      Mouth: Mucous membranes are moist.      Pharynx: Oropharynx is clear.   Eyes:      General:         Right eye: No discharge.         Left eye: No discharge.      Extraocular Movements: Extraocular movements intact.      Right eye: Normal extraocular motion.      Left eye: Normal extraocular motion.      Conjunctiva/sclera:      Right eye: Right conjunctiva is injected. No exudate.     Pupils: Pupils are  equal, round, and reactive to light.   Cardiovascular:      Rate and Rhythm: Normal rate and regular rhythm.      Pulses: Normal pulses.      Heart sounds: Normal heart sounds. No murmur heard.  Pulmonary:      Effort: Pulmonary effort is normal. No respiratory distress.      Breath sounds: Normal breath sounds. No wheezing, rhonchi or rales.   Abdominal:      General: Abdomen is flat.      Palpations: Abdomen is soft.      Tenderness: There is no abdominal tenderness.   Musculoskeletal:      Cervical back: Normal range of motion and neck supple. No rigidity or tenderness.      Right lower leg: No edema.      Left lower leg: No edema.   Lymphadenopathy:      Cervical: No cervical adenopathy.   Skin:     General: Skin is warm and dry.      Capillary Refill: Capillary refill takes less than 2 seconds.   Neurological:      General: No focal deficit present.      Mental Status: She is alert and oriented to person, place, and time.      Sensory: No sensory deficit.      Motor: No weakness.   Psychiatric:         Mood and Affect: Mood normal.         Behavior: Behavior normal.         ED Medications  Medications   erythromycin (ILOTYCIN) 0.5 % ophthalmic ointment ( Right Eye Given 2/25/24 1455)       Diagnostic Studies  Results Reviewed       Procedure Component Value Units Date/Time    FLU/RSV/COVID - if FLU/RSV clinically relevant [[id number]]  (Normal) Collected: 02/25/24 1455    Lab Status: Final result Specimen: Nares from Nose Updated: 02/25/24 1546     SARS-CoV-2 Negative     INFLUENZA A PCR Negative     INFLUENZA B PCR Negative     RSV PCR Negative    Narrative:      FOR PEDIATRIC PATIENTS - copy/paste COVID Guidelines URL to browser: [email or web address]    SARS-CoV-2 assay is a Nucleic Acid Amplification assay intended for the  qualitative detection of nucleic acid from SARS-CoV-2 in nasopharyngeal  swabs. Results are for the presumptive identification of  SARS-CoV-2 RNA.    Positive results are indicative of infection with SARS-CoV-2, the virus  causing COVID-19, but do not rule out bacterial infection or co-infection  with other viruses. Laboratories within the [place] and its  territories are required to report all positive results to the appropriate  public health authorities. Negative results do not preclude SARS-CoV-2  infection and should not be used as the sole basis for treatment or other  patient management decisions. Negative results must be combined with  clinical observations, patient history, and epidemiological information.  This test has not been FDA cleared or approved.    This test has been authorized by FDA under an Emergency Use Authorization  (EUA). This test is only authorized for the duration of time the  declaration that circumstances exist justifying the authorization of the  emergency use of an in vitro diagnostic tests for detection of SARS-CoV-2  virus and/or diagnosis of COVID-19 infection under section 564(b)(1) of  the Act, 21 U.S.C. 360bbb-3(b)(1), unless the authorization is terminated  or revoked sooner. The test has been validated but independent review by FDA  and CLIA is pending.    Test performed using [employer] GeneXpert: This RT-PCR assay targets N2,  a region unique to SARS-CoV-2. A conserved region in the E-gene was chosen  for pan-Sarbecovirus detection which includes SARS-CoV-2.    According to CMS-2020-01-R, this platform meets the definition of high-throughput technology.                   No orders to display         Procedures  Procedures      ED Course  ED Course as of 02/26/24 0047   Sun Feb 25, 2024   1441 Temperature(!): 97.3 °F (36.3 °C)  Afebrile   1441 Blood Pressure: 104/60  104/60, HR 82, RR 18, SpO2 98% RA, 97.3 F                                       Medical Decision Making  Initial ED assessment:  40 yo female with history of pre-eclampsia, allergy to Ceftriaxone presenting for 5 days of URI symptoms  including cough, rhinorrhea, congestion, sore throat and right eye irritation. Patient presents with the rest of her family of 4, all with similar symptoms, starting with 14 month old son whose symptoms started about 2 weeks ago. She denies fevers, chills, no nausea, no vomiting, no chest pain, abdominal pain, dysuria, frequency, diarrhea. Able to eat and drink without issues. Received flu shot this year.    In the ED patient is afebrile, vital signs within normal limits  On exam patient is alert. HEENT shows no abnormalities of patient's ears, no adenopathy of throat. Erythema in posterior pharynx. Right eye mildly injected, no exudate. Heart regular rate and rhythm. Lungs clear bilaterally. Abdomen soft, non-tender, non-distended, no rebound, no bruit, normal bowel sounds. Pulses, sensation and strength intact in all four extremities. No peripheral edema.    Initial DDx includes but is not limited to: viral upper respiratory infection, acute conjunctivitis including infectious and irritative. Doubt epiglottitis, peritonsillar abscess, retropharyngeal abscess.     COVID/Flu/RSV test ordered. Discussed with patient who is agreeable to discharge with contact with positive results. Patient has access to [facility] and said she would be watching.    See ED Course for further updates    At this time, likely viral URI symptoms, however will discharge with Erythromycin ointment for acute conjunctivitis.    Final ED summary/disposition: After evaluation and workup in the emergency department, patient has been hemodynamically stable, expresses understanding and agrees with plan of care at this time.  In light of this patient would benefit from outpatient management.   Discussed all results with patient including lab work that is pending and evaluation.  Discussed strict return precautions.  Discussed importance of following up with PCP in the next few days.  All questions answered.  Patient is agreeable to discharge.  Patient  discharged with erythromycin ointment.    Amount and/or Complexity of Data Reviewed  Independent Historian: spouse  External Data Reviewed: notes.  Labs: ordered.    Risk  OTC drugs.  Prescription drug management.          Disposition  Final diagnoses:   Flu-like symptoms   Acute conjunctivitis     Time reflects when diagnosis was documented in both MDM as applicable and the Disposition within this note       Time User Action Codes Description Comment    2/25/2024  2:45 PM Gomez, Jenna Add [J06.9] URI (upper respiratory infection)     2/25/2024  2:45 PM Gomez, Jenna Add [H10.9] Conjunctivitis     2/25/2024  2:49 PM Gomez, Jenna Add [R68.89] Flu-like symptoms     2/25/2024  2:49 PM Gomez, Jenna Modify [J06.9] URI (upper respiratory infection)     2/25/2024  2:49 PM Gomez, Jenna Modify [R68.89] Flu-like symptoms     2/25/2024  2:49 PM Gomez, Jenna Remove [J06.9] URI (upper respiratory infection)     2/25/2024  2:49 PM Gomez, Jenna Add [H10.30] Acute conjunctivitis     2/25/2024  2:49 PM Gomez, Jenna Remove [H10.9] Conjunctivitis           ED Disposition       ED Disposition   Discharge    Condition   Stable    Date/Time   Sun Feb 25, 2024  2:42 PM    Comment   Livier Gupta discharge to home/self care.                   Follow-up Information       Follow up With Specialties Details Why Contact Info Additional Information    [facility] Medicine Family Medicine Go to  Re-evaluation of symptoms [address]  [facility] [id number] [phone] [facility] Family Medicine, [address], [address], [place], [address], [phone]    [facility] Emergency Department Emergency Medicine Go to  As needed, If symptoms worsen [address] [facility] [address] [phone] [facility] Emergency Department, [facility], [place], [address]             Discharge Medication List as of 2/25/2024  2:53 PM        CONTINUE these medications which have NOT CHANGED    Details   acetaminophen (TYLENOL) 325 mg tablet Take 2 tablets (650 mg total) by mouth every 6 (six) hours as needed for mild pain or moderate pain, Starting Tue 12/27/2022, No Print      ascorbic acid (VITAMIN C) 500 MG tablet Take 500 mg by mouth 2 (two) times a day, Starting Sat 10/14/2023, Until Sun 10/13/2024, Historical Med      Cholecalciferol 1.25 MG (31158 UT) capsule Take 1 capsule by mouth once a week, Starting Wed 10/18/2023, Until Thu 1/4/2024, Historical Med      docusate sodium (COLACE) 100 mg capsule Take 1 capsule (100 mg total) by mouth 2 (two) times a day as needed for constipation, Starting Tue 12/27/2022, No Print      Doxylamine-Pyridoxine 10-10 MG TBEC * Take 2 tabs PO HS; contin if controls sympt. * If sympt persist, 2 tabs HS then 3 tabs starting Day 3 (1 tab AM + 2 tabs HS) & continue if sympt controlled * If 3 tabs Day 3 does not control sympt, 4 tabs starting Day 4 (1 AM, 1 mid-afternoon, 2 HS) In dications: Nausea and Vomiting in Pregnancy, Historical Med      ferrous sulfate 325 (65 Fe) mg tablet Take 325 mg by mouth daily with breakfast, Historical Med      Lecithin 1200 MG CAPS Take 1 capsule by mouth, Historical Med      norethindrone (MICRONOR) 0.35 MG tablet Take 1 tablet (0.35 mg total) by mouth daily, Starting Mon 10/23/2023, Normal      Prenatal MV-Min-Fe Fum-FA-DHA (PRENATAL 1 PO) Take 1 tablet by mouth, Historical Med      prenatal vitamin (CLASSIC FORMULA) 27-0.8 mg Take by mouth, Historical Med      psyllium (METAMUCIL) 58.6 % powder Take 1 packet by mouth 3 (three) times a day, Historical Med      sertraline (ZOLOFT) 100 mg tablet Take 0.5 tablets (50 mg total) by mouth daily, Starting Thu 4/20/2023, Normal           No discharge procedures on file.    PDMP Review       None             ED Provider  Attending physically available and evaluated Livier  Alonso. I managed the patient along with the ED Attending.    Electronically Signed by           Jenna Gomez MD  02/26/24 0047

## 2024-06-15 DIAGNOSIS — Z00.6 ENCOUNTER FOR EXAMINATION FOR NORMAL COMPARISON OR CONTROL IN CLINICAL RESEARCH PROGRAM: ICD-10-CM

## 2025-01-08 ENCOUNTER — TELEPHONE (OUTPATIENT)
Age: 40
End: 2025-01-08

## 2025-01-08 NOTE — TELEPHONE ENCOUNTER
Patient scheduled on my chart for yearly and she should be new patient. Can you please change to new at  your convenience? Thank you

## 2025-02-10 ENCOUNTER — HOSPITAL ENCOUNTER (EMERGENCY)
Facility: HOSPITAL | Age: 40
Discharge: HOME | End: 2025-02-10
Attending: EMERGENCY MEDICINE | Admitting: EMERGENCY MEDICINE
Payer: COMMERCIAL

## 2025-02-10 ENCOUNTER — APPOINTMENT (EMERGENCY)
Dept: CT IMAGING | Facility: HOSPITAL | Age: 40
End: 2025-02-10
Payer: COMMERCIAL

## 2025-02-10 VITALS
BODY MASS INDEX: 34.46 KG/M2 | TEMPERATURE: 97.9 F | RESPIRATION RATE: 18 BRPM | OXYGEN SATURATION: 99 % | WEIGHT: 220 LBS | HEART RATE: 78 BPM | DIASTOLIC BLOOD PRESSURE: 73 MMHG | SYSTOLIC BLOOD PRESSURE: 152 MMHG

## 2025-02-10 DIAGNOSIS — S39.012A LUMBAR STRAIN, INITIAL ENCOUNTER: ICD-10-CM

## 2025-02-10 DIAGNOSIS — S16.1XXA CERVICAL STRAIN, ACUTE: ICD-10-CM
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
## 2025-02-10 PROCEDURE — 72125 CT NECK SPINE W/O DYE: CPT

## 2025-02-10 PROCEDURE — 72131 CT LUMBAR SPINE W/O DYE: CPT

## 2025-02-10 PROCEDURE — 99284 EMERGENCY DEPT VISIT MOD MDM: CPT

## 2025-02-10 PROCEDURE — 99284 EMERGENCY DEPT VISIT MOD MDM: CPT | Performed by: PHYSICIAN ASSISTANT

## 2025-02-10 RX ORDER — METHOCARBAMOL 500 MG/1
500 TABLET, FILM COATED ORAL 3 TIMES DAILY
Qty: 20 TABLET | Refills: 0 | Status: SHIPPED | OUTPATIENT
Start: 2025-02-10

## 2025-02-10 RX ORDER — ACETAMINOPHEN 325 MG/1
650 TABLET ORAL ONCE
Status: COMPLETED | OUTPATIENT
Start: 2025-02-10 | End: 2025-02-10

## 2025-02-10 RX ORDER — METHOCARBAMOL 500 MG/1
500 TABLET, FILM COATED ORAL ONCE
Status: COMPLETED | OUTPATIENT
Start: 2025-02-10 | End: 2025-02-10

## 2025-02-10 RX ADMIN — ACETAMINOPHEN 650 MG: 325 TABLET, FILM COATED ORAL at 11:29

## 2025-02-10 RX ADMIN — METHOCARBAMOL 500 MG: 500 TABLET ORAL at 11:29

## 2025-02-10 NOTE — Clinical Note
Livier Gupta was seen and treated in our emergency department on 2/10/2025.                Diagnosis:     Livier  may return to work on return date.    She may return on this date: 02/13/2025         If you have any questions or concerns, please don't hesitate to call.      Christel Benedict PA-C    ______________________________           _______________          _______________  Hospital Representative                              Date                                Time

## 2025-02-10 NOTE — DISCHARGE INSTRUCTIONS
Rest, ice for next 2 days, heat after 2 days.  Tylenol for discomfort.  Robaxin 3 times a day as needed.  Follow up with PCP in 3-5 days for recheck.  Return to ER if symptoms worsen.
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
## 2025-03-07 ENCOUNTER — ANNUAL EXAM (OUTPATIENT)
Dept: OBGYN CLINIC | Facility: CLINIC | Age: 40
End: 2025-03-07
Payer: COMMERCIAL

## 2025-03-07 VITALS
DIASTOLIC BLOOD PRESSURE: 80 MMHG | WEIGHT: 238 LBS | SYSTOLIC BLOOD PRESSURE: 124 MMHG | BODY MASS INDEX: 37.35 KG/M2 | HEIGHT: 67 IN

## 2025-03-07 DIAGNOSIS — Z01.419 WOMEN'S ANNUAL ROUTINE GYNECOLOGICAL EXAMINATION: Primary | ICD-10-CM

## 2025-03-07 DIAGNOSIS — Z23 NEED FOR HPV VACCINATION: ICD-10-CM

## 2025-03-07 PROCEDURE — 90471 IMMUNIZATION ADMIN: CPT | Performed by: PHYSICIAN ASSISTANT

## 2025-03-07 PROCEDURE — 90651 9VHPV VACCINE 2/3 DOSE IM: CPT | Performed by: PHYSICIAN ASSISTANT

## 2025-03-07 PROCEDURE — 99385 PREV VISIT NEW AGE 18-39: CPT | Performed by: PHYSICIAN ASSISTANT

## 2025-03-07 NOTE — PROGRESS NOTES
ASSESSMENT & PLAN:   Diagnoses and all orders for this visit:    Women's annual routine gynecological examination    Other orders  -     Multiple Vitamins-Minerals (BARIATRIC MULTIVITAMINS PO); Take by mouth          The following were reviewed in today's visit: ASCCP guidelines, Gardasil vaccination, STD testing breast self exam, STD testing, exercise, and healthy diet.    Patient to return to office in yearly for annual exam.     All questions have been answered to her satisfaction.        CC:  Annual Gynecologic Examination  Chief Complaint   Patient presents with    Gynecologic Exam     Pap normal HPV neg 10/23/23.        HPI: Livier Gupta is a 40 y.o.  who presents for annual gynecologic examination.  She has the following concerns:  none at this time. Doing well. Working on diet and exercise.       Health Maintenance:    Exercise: frequently  Breast exams/breast awareness: yes    Past Medical History:   Diagnosis Date    Abnormal Pap smear of cervix     Dermoid cyst of left ovary     Hx of preeclampsia, prior pregnancy, currently pregnant 2022    Pre-eclampsia, severe, postpartum condition     2014    Varicella        Past Surgical History:   Procedure Laterality Date    CHOLECYSTECTOMY      CYSTECTOMY Left     DERMOID CYST  EXCISION Left     OVARY    DISCECTOMY SPINE LUMBAR W/ ARTHROPLASTY      GASTRECTOMY SLEEVE LAPAROSCOPIC             Past OB/Gyn History:  Period Cycle (Days): 28  Period Pattern: Regular  Menstrual Flow: Light, Heavy  Dysmenorrhea: (!) Mild  Dysmenorrhea Symptoms: Cramping, HeadachePatient's last menstrual period was 2025 (exact date).    Last Pap:  : no abnormalities  History of abnormal Pap smear: yes - , FOLLOW UP WAS NORMAL  HPV vaccine completed: no - would like to start today    Patient is currently sexually active.   STD testing: no  Current contraception:vasectomy      Family History  Family History   Problem Relation Age of Onset    Hypertension  Mother     Diabetes Mother     No Known Problems Father        Family history of uterine or ovarian cancer: no  Family history of breast cancer: no  Family history of colon cancer: no    Social History:  Social History     Socioeconomic History    Marital status: /Civil Union     Spouse name: Not on file    Number of children: Not on file    Years of education: Not on file    Highest education level: Not on file   Occupational History    Not on file   Tobacco Use    Smoking status: Never    Smokeless tobacco: Never   Vaping Use    Vaping status: Never Used   Substance and Sexual Activity    Alcohol use: Yes     Comment: SOCIALLY    Drug use: Not Currently     Types: Marijuana    Sexual activity: Yes     Partners: Male     Birth control/protection: Male Sterilization   Other Topics Concern    Not on file   Social History Narrative    Not on file     Social Drivers of Health     Financial Resource Strain: Low Risk  (5/24/2024)    Received from [facility]    Overall Financial Resource Strain (CARDIA)     Difficulty of Paying Living Expenses: Not hard at all   Food Insecurity: No Food Insecurity (5/24/2024)    Received from [facility]    Hunger Vital Sign     Worried About Running Out of Food in the Last Year: Never true     Ran Out of Food in the Last Year: Never true   Transportation Needs: No Transportation Needs (5/24/2024)    Received from [facility]    PRAPARE - Transportation     Lack of Transportation (Medical): No     Lack of Transportation (Non-Medical): No   Physical Activity: Insufficiently Active (3/7/2023)    Received from [facility]    Exercise Vital Sign     Days of Exercise per Week: 3 days     Minutes of Exercise per Session: 30 min   Stress: No Stress Concern Present (3/7/2023)    Received from [facility]    [employer] - Occupational Stress Questionnaire     Feeling of Stress  : Only a little   Social Connections: Unknown (3/7/2023)    Received from [facility]    Social Connection and Isolation Panel [NHANES]     Frequency of Communication with Friends and Family: More than three times a week     Frequency of Social Gatherings with Friends and Family: Twice a week     Attends Jain Services: Patient declined     Active Member of Clubs or Organizations: No     Attends Club or Organization Meetings: Patient declined     Marital Status:    Intimate Partner Violence: Not At Risk (5/24/2024)    Received from [facility]    Humiliation, Afraid, Rape, and Kick questionnaire     Fear of Current or Ex-Partner: No     Emotionally Abused: No     Physically Abused: No     Sexually Abused: No   Housing Stability: High Risk (5/24/2024)    Received from [facility]    Housing Stability Vital Sign     Unable to Pay for Housing in the Last Year: Yes     Number of Times Moved in the Last Year: 0     Homeless in the Last Year: No     Domestic violence screen: negative    Allergies:  Allergies   Allergen Reactions    Ceftriaxone Anaphylaxis       Medications:    Current Outpatient Medications:     acetaminophen (TYLENOL) 325 mg tablet, Take 2 tablets (650 mg total) by mouth every 6 (six) hours as needed for mild pain or moderate pain, Disp: , Rfl: 0    ascorbic acid (VITAMIN C) 500 MG tablet, Take 500 mg by mouth 2 (two) times a day, Disp: , Rfl:     Cholecalciferol 1.25 MG (96656 UT) capsule, Take 1 capsule by mouth once a week, Disp: , Rfl:     Multiple Vitamins-Minerals (BARIATRIC MULTIVITAMINS PO), Take by mouth, Disp: , Rfl:     Review of Systems:  Review of Systems   Constitutional:  Negative for chills, fever and unexpected weight change.   Respiratory:  Negative for shortness of breath.    Cardiovascular:  Negative for chest pain.   Gastrointestinal:  Negative for abdominal pain, diarrhea, nausea and  "vomiting.   Skin:  Negative for rash.   Psychiatric/Behavioral:  Negative for dysphoric mood. The patient is not nervous/anxious.          Physical Exam:  /80 (BP Location: Right arm, Patient Position: Sitting, Cuff Size: Standard)   Ht 5' 7\" (1.702 m)   Wt 108 kg (238 lb)   LMP 02/13/2025 (Exact Date)   BMI 37.28 kg/m²    Physical Exam  Constitutional:       Appearance: Normal appearance.   Genitourinary:      Vulva and urethral meatus normal.      No lesions in the vagina.      Right Labia: No rash or lesions.     Left Labia: No lesions or rash.     No vaginal discharge, erythema or bleeding.        Right Adnexa: not tender and no mass present.     Left Adnexa: not tender and no mass present.     No cervical discharge or lesion.      Uterus is not tender.   Breasts:     Breasts are symmetrical.      Right: No mass or skin change.      Left: No mass or skin change.   HENT:      Head: Normocephalic and atraumatic.   Cardiovascular:      Rate and Rhythm: Normal rate and regular rhythm.      Heart sounds: Normal heart sounds. No murmur heard.     No friction rub. No gallop.   Pulmonary:      Effort: Pulmonary effort is normal.      Breath sounds: Normal breath sounds. No wheezing, rhonchi or rales.   Abdominal:      General: Abdomen is flat. There is no distension.      Palpations: Abdomen is soft.      Tenderness: There is no abdominal tenderness.   Musculoskeletal:      Cervical back: Neck supple.   Lymphadenopathy:      Upper Body:      Right upper body: No axillary adenopathy.      Left upper body: No axillary adenopathy.   Neurological:      General: No focal deficit present.      Mental Status: She is alert.   Skin:     General: Skin is warm and dry.   Psychiatric:         Mood and Affect: Mood normal.         Behavior: Behavior normal.   Vitals reviewed.                               "

## 2025-04-04 ENCOUNTER — OFFICE VISIT (OUTPATIENT)
Dept: OBGYN CLINIC | Facility: CLINIC | Age: 40
End: 2025-04-04
Payer: COMMERCIAL

## 2025-04-04 VITALS — BODY MASS INDEX: 35.46 KG/M2 | SYSTOLIC BLOOD PRESSURE: 124 MMHG | DIASTOLIC BLOOD PRESSURE: 82 MMHG | WEIGHT: 226.4 LBS

## 2025-04-04 DIAGNOSIS — N64.4 BREAST PAIN IN FEMALE: ICD-10-CM

## 2025-04-04 DIAGNOSIS — Q83.1 ACCESSORY BREAST TISSUE OF AXILLA: ICD-10-CM

## 2025-04-04 DIAGNOSIS — Z23 NEED FOR HPV VACCINE: ICD-10-CM

## 2025-04-04 DIAGNOSIS — R30.9 PAINFUL URINATION: Primary | ICD-10-CM

## 2025-04-04 LAB
SL AMB  POCT GLUCOSE, UA: NEGATIVE
SL AMB LEUKOCYTE ESTERASE,UA: NEGATIVE
SL AMB POCT BILIRUBIN,UA: ABNORMAL
SL AMB POCT BLOOD,UA: ABNORMAL
SL AMB POCT CLARITY,UA: CLEAR
SL AMB POCT COLOR,UA: ABNORMAL
SL AMB POCT KETONES,UA: ABNORMAL
SL AMB POCT NITRITE,UA: NEGATIVE
SL AMB POCT PH,UA: 6
SL AMB POCT SPECIFIC GRAVITY,UA: 1.03
SL AMB POCT URINE PROTEIN: ABNORMAL
SL AMB POCT UROBILINOGEN: NEGATIVE

## 2025-04-04 PROCEDURE — 90471 IMMUNIZATION ADMIN: CPT | Performed by: PHYSICIAN ASSISTANT

## 2025-04-04 PROCEDURE — 90651 9VHPV VACCINE 2/3 DOSE IM: CPT | Performed by: PHYSICIAN ASSISTANT

## 2025-04-04 PROCEDURE — 81003 URINALYSIS AUTO W/O SCOPE: CPT | Performed by: PHYSICIAN ASSISTANT

## 2025-04-04 PROCEDURE — 99214 OFFICE O/P EST MOD 30 MIN: CPT | Performed by: PHYSICIAN ASSISTANT

## 2025-04-04 PROCEDURE — 87086 URINE CULTURE/COLONY COUNT: CPT | Performed by: PHYSICIAN ASSISTANT

## 2025-04-04 RX ORDER — SULFAMETHOXAZOLE AND TRIMETHOPRIM 800; 160 MG/1; MG/1
1 TABLET ORAL EVERY 12 HOURS SCHEDULED
Qty: 6 TABLET | Refills: 0 | Status: SHIPPED | OUTPATIENT
Start: 2025-04-04 | End: 2025-04-07

## 2025-04-04 RX ORDER — FLUCONAZOLE 150 MG/1
150 TABLET ORAL
Qty: 3 TABLET | Refills: 0 | Status: SHIPPED | OUTPATIENT
Start: 2025-04-04 | End: 2025-04-11

## 2025-04-04 NOTE — PROGRESS NOTES
ASSESSMENT/PLAN:    Encounter Diagnosis     ICD-10-CM    1. Painful urination  R30.9 POCT urine dip auto non-scope     sulfamethoxazole-trimethoprim (BACTRIM DS) 800-160 mg per tablet     fluconazole (DIFLUCAN) 150 mg tablet     Urine culture      2. Accessory breast tissue of axilla  Q83.1 US breast axilla right     Mammo diagnostic bilateral w 3d and cad      3. Breast pain in female  N64.4 US breast axilla right     Mammo diagnostic bilateral w 3d and cad      4. Need for HPV vaccine  Z23 HPV VACCINE 9 VALENT IM    #1 - 3/5/25, #2 - 25, #3 - due in 4 months            SUBJECTIVE/HPI:      Patient ID: Livier Gupta [id number]        Livier Gupta is a 40 y.o.  presenting to the office with complaints of painful urination and a sensation of incomplete emptying. She denies any vaginal discharge or odor.   She also has breast swelling. She has a hx of accessory breast tissue in the left axilla. She has noticed significant swelling of the right axilla and a lump.       HISTORY:    Patient Active Problem List   Diagnosis    H/O gastric sleeve    Postpartum anxiety       Allergies   Allergen Reactions    Ceftriaxone Anaphylaxis         Current Outpatient Medications:     ascorbic acid (VITAMIN C) 500 MG tablet, Take 500 mg by mouth 2 (two) times a day, Disp: , Rfl:     Cholecalciferol 1.25 MG (54340 UT) capsule, Take 1 capsule by mouth once a week, Disp: , Rfl:     fluconazole (DIFLUCAN) 150 mg tablet, Take 1 tablet (150 mg total) by mouth every third day for 3 doses, Disp: 3 tablet, Rfl: 0    Multiple Vitamins-Minerals (BARIATRIC MULTIVITAMINS PO), Take by mouth, Disp: , Rfl:     sulfamethoxazole-trimethoprim (BACTRIM DS) 800-160 mg per tablet, Take 1 tablet by mouth every 12 (twelve) hours for 3 days, Disp: 6 tablet, Rfl: 0    acetaminophen (TYLENOL) 325 mg tablet, Take 2 tablets (650 mg total) by mouth every 6 (six) hours as needed for mild pain or moderate pain (Patient not taking: Reported on  2025), Disp: , Rfl: 0    OB History          4    Para   2    Term   2            AB   2    Living   2         SAB        IAB        Ectopic        Multiple   0    Live Births   2                 Past Medical History:   Diagnosis Date    Abnormal Pap smear of cervix     Dermoid cyst of left ovary     Hx of preeclampsia, prior pregnancy, currently pregnant 2022    Pre-eclampsia, severe, postpartum condition     2014    Varicella         Past Surgical History:   Procedure Laterality Date    CHOLECYSTECTOMY      CYSTECTOMY Left     DERMOID CYST  EXCISION Left     OVARY    DISCECTOMY SPINE LUMBAR W/ ARTHROPLASTY      GASTRECTOMY SLEEVE LAPAROSCOPIC              Family History   Problem Relation Age of Onset    Hypertension Mother     Diabetes Mother     No Known Problems Father         REVIEW OF SYSTEMS:  Review of Systems   Genitourinary:  Positive for dysuria and frequency.        OBJECTIVE:    Visit Vitals  /82 (BP Location: Right arm, Patient Position: Sitting, Cuff Size: Standard)   Wt 103 kg (226 lb 6.4 oz)   LMP 2025 (Exact Date)   Breastfeeding No   BMI 35.46 kg/m²   OB Status Unknown   Smoking Status Never   BSA 2.13 m²         Physical Exam  Constitutional:       Appearance: Normal appearance.   Genitourinary:   Breasts:     Breasts are symmetrical.      Right: No swelling, bleeding, inverted nipple, mass, nipple discharge, skin change or tenderness.      Left: Swelling present. No bleeding, inverted nipple, mass, nipple discharge, skin change or tenderness.   HENT:      Head: Normocephalic and atraumatic.   Pulmonary:      Effort: Pulmonary effort is normal.   Chest:       Lymphadenopathy:      Upper Body:      Right upper body: Axillary adenopathy present.      Left upper body: No axillary adenopathy.   Neurological:      General: No focal deficit present.      Mental Status: She is alert.   Skin:     General: Skin is warm and dry.   Psychiatric:         Mood and  Affect: Mood normal.         Behavior: Behavior normal.   Vitals reviewed.

## 2025-04-05 LAB — BACTERIA UR CULT: NORMAL

## 2025-04-07 ENCOUNTER — RESULTS FOLLOW-UP (OUTPATIENT)
Dept: OBGYN CLINIC | Facility: CLINIC | Age: 40
End: 2025-04-07